# Patient Record
Sex: MALE | Race: WHITE | NOT HISPANIC OR LATINO | Employment: OTHER | URBAN - METROPOLITAN AREA
[De-identification: names, ages, dates, MRNs, and addresses within clinical notes are randomized per-mention and may not be internally consistent; named-entity substitution may affect disease eponyms.]

---

## 2023-08-29 PROBLEM — G47.33 OSA ON CPAP: Status: ACTIVE | Noted: 2023-08-29

## 2023-08-29 PROBLEM — M70.22 OLECRANON BURSITIS OF LEFT ELBOW: Status: ACTIVE | Noted: 2023-08-29

## 2023-08-29 PROBLEM — E23.6 EMPTY SELLA SYNDROME (MULTI): Status: ACTIVE | Noted: 2023-08-29

## 2023-08-29 PROBLEM — R14.2 ERUCTATION: Status: ACTIVE | Noted: 2023-08-29

## 2023-08-29 PROBLEM — K21.9 GERD (GASTROESOPHAGEAL REFLUX DISEASE): Status: ACTIVE | Noted: 2023-08-29

## 2023-08-29 PROBLEM — R06.09 EXERTIONAL DYSPNEA: Status: ACTIVE | Noted: 2023-08-29

## 2023-08-29 PROBLEM — M54.17 LUMBOSACRAL RADICULOPATHY AT L5: Status: ACTIVE | Noted: 2023-08-29

## 2023-08-29 PROBLEM — D68.52 PROTHROMBIN G20210A MUTATION (MULTI): Status: ACTIVE | Noted: 2023-08-29

## 2023-08-29 PROBLEM — E04.1 THYROID NODULE: Status: ACTIVE | Noted: 2023-08-29

## 2023-08-29 PROBLEM — E78.5 HYPERLIPIDEMIA: Status: ACTIVE | Noted: 2023-08-29

## 2023-08-29 PROBLEM — I25.10 ARTERIOSCLEROTIC HEART DISEASE: Status: ACTIVE | Noted: 2023-08-29

## 2023-08-29 PROBLEM — R97.20 PSA ELEVATION: Status: ACTIVE | Noted: 2023-08-29

## 2023-08-29 PROBLEM — R41.3 MEMORY CHANGE: Status: ACTIVE | Noted: 2023-08-29

## 2023-08-29 PROBLEM — I87.2 VENOUS INSUFFICIENCY (CHRONIC) (PERIPHERAL): Status: ACTIVE | Noted: 2023-08-29

## 2023-08-29 PROBLEM — J30.9 ALLERGIC RHINITIS: Status: ACTIVE | Noted: 2023-08-29

## 2023-08-29 PROBLEM — M85.80 OSTEOPENIA: Status: ACTIVE | Noted: 2023-08-29

## 2023-08-29 PROBLEM — I10 HYPERTENSION: Status: ACTIVE | Noted: 2023-08-29

## 2023-08-29 PROBLEM — I48.91 ATRIAL FIBRILLATION (MULTI): Status: ACTIVE | Noted: 2023-08-29

## 2023-08-29 RX ORDER — ALENDRONATE SODIUM 70 MG/1
70 TABLET ORAL
COMMUNITY

## 2023-08-29 RX ORDER — MODAFINIL 200 MG/1
1 TABLET ORAL 2 TIMES DAILY
COMMUNITY
Start: 2014-04-18

## 2023-08-29 RX ORDER — PERMETHRIN 50 MG/G
CREAM TOPICAL ONCE
COMMUNITY
End: 2023-10-20 | Stop reason: WASHOUT

## 2023-08-29 RX ORDER — FERROUS SULFATE, DRIED 160(50) MG
1 TABLET, EXTENDED RELEASE ORAL DAILY
COMMUNITY
End: 2023-10-20 | Stop reason: WASHOUT

## 2023-08-29 RX ORDER — SILDENAFIL 100 MG/1
TABLET, FILM COATED ORAL
COMMUNITY
Start: 2021-05-31

## 2023-08-29 RX ORDER — ESOMEPRAZOLE MAGNESIUM 40 MG/1
1 CAPSULE, DELAYED RELEASE ORAL DAILY
COMMUNITY
Start: 2014-04-01 | End: 2023-10-09 | Stop reason: SDUPTHER

## 2023-08-29 RX ORDER — DIGOXIN 125 MCG
1 TABLET ORAL DAILY
COMMUNITY
Start: 2018-08-03

## 2023-08-29 RX ORDER — ATORVASTATIN CALCIUM 20 MG/1
1 TABLET, FILM COATED ORAL DAILY
COMMUNITY
Start: 2015-04-27

## 2023-08-29 RX ORDER — NIACIN 500 MG/1
1 TABLET, EXTENDED RELEASE ORAL DAILY
COMMUNITY
Start: 2014-04-01

## 2023-08-29 RX ORDER — GLUCOSAMINE HCL 500 MG
1 TABLET ORAL DAILY
COMMUNITY
Start: 2014-04-01

## 2023-08-29 RX ORDER — AMLODIPINE BESYLATE 5 MG/1
1 TABLET ORAL DAILY
COMMUNITY
Start: 2017-03-18

## 2023-08-29 RX ORDER — ACETAMINOPHEN 500 MG
1 TABLET ORAL DAILY
COMMUNITY
Start: 2015-10-27 | End: 2023-10-20 | Stop reason: WASHOUT

## 2023-08-29 RX ORDER — IPRATROPIUM BROMIDE 42 UG/1
SPRAY, METERED NASAL
COMMUNITY
Start: 2017-11-07

## 2023-08-29 RX ORDER — AZELASTINE 1 MG/ML
1 SPRAY, METERED NASAL 2 TIMES DAILY
COMMUNITY

## 2023-08-29 RX ORDER — DUTASTERIDE AND TAMSULOSIN HYDROCHLORIDE CAPSULES .5; .4 MG/1; MG/1
CAPSULE ORAL
COMMUNITY
End: 2023-10-20 | Stop reason: WASHOUT

## 2023-08-29 RX ORDER — NEOMYCIN SULFATE, POLYMYXIN B SULFATE AND DEXAMETHASONE 3.5; 10000; 1 MG/ML; [USP'U]/ML; MG/ML
1 SUSPENSION/ DROPS OPHTHALMIC 4 TIMES DAILY
Status: ON HOLD | COMMUNITY
End: 2023-10-24 | Stop reason: ALTCHOICE

## 2023-08-29 RX ORDER — CELECOXIB 200 MG/1
CAPSULE ORAL
COMMUNITY
Start: 2021-06-15

## 2023-08-29 RX ORDER — ASPIRIN 81 MG
2 TABLET, DELAYED RELEASE (ENTERIC COATED) ORAL DAILY
COMMUNITY
Start: 2015-12-04

## 2023-08-29 RX ORDER — TAMSULOSIN HYDROCHLORIDE 0.4 MG/1
CAPSULE ORAL
COMMUNITY
Start: 2017-05-15 | End: 2023-10-24 | Stop reason: HOSPADM

## 2023-08-29 RX ORDER — LOSARTAN POTASSIUM 50 MG/1
1 TABLET ORAL DAILY
COMMUNITY
Start: 2021-08-16

## 2023-08-29 RX ORDER — MECLIZINE HYDROCHLORIDE 25 MG/1
25 TABLET ORAL 3 TIMES DAILY PRN
COMMUNITY
End: 2023-10-20 | Stop reason: WASHOUT

## 2023-08-29 RX ORDER — MOLNUPIRAVIR 200 MG/1
4 CAPSULE ORAL 2 TIMES DAILY
COMMUNITY
Start: 2022-08-24 | End: 2023-10-20 | Stop reason: WASHOUT

## 2023-10-09 ENCOUNTER — TELEPHONE (OUTPATIENT)
Dept: GASTROENTEROLOGY | Facility: HOSPITAL | Age: 78
End: 2023-10-09
Payer: MEDICARE

## 2023-10-09 DIAGNOSIS — K21.9 GASTROESOPHAGEAL REFLUX DISEASE, UNSPECIFIED WHETHER ESOPHAGITIS PRESENT: Primary | ICD-10-CM

## 2023-10-09 RX ORDER — ESOMEPRAZOLE MAGNESIUM 40 MG/1
CAPSULE, DELAYED RELEASE ORAL
Qty: 60 CAPSULE | Refills: 11 | Status: SHIPPED | OUTPATIENT
Start: 2023-10-09

## 2023-10-17 ENCOUNTER — LAB (OUTPATIENT)
Dept: LAB | Facility: LAB | Age: 78
End: 2023-10-17
Payer: MEDICARE

## 2023-10-17 ENCOUNTER — APPOINTMENT (OUTPATIENT)
Dept: PREADMISSION TESTING | Facility: HOSPITAL | Age: 78
End: 2023-10-17
Payer: MEDICARE

## 2023-10-17 ENCOUNTER — TELEPHONE (OUTPATIENT)
Dept: PREADMISSION TESTING | Facility: HOSPITAL | Age: 78
End: 2023-10-17

## 2023-10-17 DIAGNOSIS — N40.1 BENIGN PROSTATIC HYPERPLASIA WITH LOWER URINARY TRACT SYMPTOMS: Primary | ICD-10-CM

## 2023-10-17 DIAGNOSIS — Z01.818 ENCOUNTER FOR OTHER PREPROCEDURAL EXAMINATION: ICD-10-CM

## 2023-10-17 LAB
ALBUMIN SERPL BCP-MCNC: 4.2 G/DL (ref 3.4–5)
ALP SERPL-CCNC: 65 U/L (ref 33–136)
ALT SERPL W P-5'-P-CCNC: 13 U/L (ref 10–52)
ANION GAP SERPL CALC-SCNC: 13 MMOL/L (ref 10–20)
AST SERPL W P-5'-P-CCNC: 14 U/L (ref 9–39)
BILIRUB SERPL-MCNC: 0.5 MG/DL (ref 0–1.2)
BUN SERPL-MCNC: 17 MG/DL (ref 6–23)
CALCIUM SERPL-MCNC: 8.4 MG/DL (ref 8.6–10.3)
CHLORIDE SERPL-SCNC: 105 MMOL/L (ref 98–107)
CO2 SERPL-SCNC: 24 MMOL/L (ref 21–32)
CREAT SERPL-MCNC: 0.85 MG/DL (ref 0.5–1.3)
ERYTHROCYTE [DISTWIDTH] IN BLOOD BY AUTOMATED COUNT: 13.9 % (ref 11.5–14.5)
GFR SERPL CREATININE-BSD FRML MDRD: 90 ML/MIN/1.73M*2
GLUCOSE SERPL-MCNC: 102 MG/DL (ref 74–99)
HCT VFR BLD AUTO: 38.1 % (ref 41–52)
HGB BLD-MCNC: 12.4 G/DL (ref 13.5–17.5)
MCH RBC QN AUTO: 28.8 PG (ref 26–34)
MCHC RBC AUTO-ENTMCNC: 32.5 G/DL (ref 32–36)
MCV RBC AUTO: 88 FL (ref 80–100)
NRBC BLD-RTO: 0 /100 WBCS (ref 0–0)
PLATELET # BLD AUTO: 217 X10*3/UL (ref 150–450)
PMV BLD AUTO: 9.8 FL (ref 7.5–11.5)
POTASSIUM SERPL-SCNC: 4 MMOL/L (ref 3.5–5.3)
PROT SERPL-MCNC: 6.4 G/DL (ref 6.4–8.2)
RBC # BLD AUTO: 4.31 X10*6/UL (ref 4.5–5.9)
SODIUM SERPL-SCNC: 138 MMOL/L (ref 136–145)
WBC # BLD AUTO: 6.6 X10*3/UL (ref 4.4–11.3)

## 2023-10-17 PROCEDURE — 85027 COMPLETE CBC AUTOMATED: CPT

## 2023-10-17 PROCEDURE — 80053 COMPREHEN METABOLIC PANEL: CPT

## 2023-10-17 PROCEDURE — 36415 COLL VENOUS BLD VENIPUNCTURE: CPT

## 2023-10-17 PROCEDURE — 87086 URINE CULTURE/COLONY COUNT: CPT

## 2023-10-18 ENCOUNTER — TELEPHONE (OUTPATIENT)
Dept: GASTROENTEROLOGY | Facility: CLINIC | Age: 78
End: 2023-10-18

## 2023-10-18 ENCOUNTER — TELEMEDICINE (OUTPATIENT)
Dept: GASTROENTEROLOGY | Facility: CLINIC | Age: 78
End: 2023-10-18
Payer: MEDICARE

## 2023-10-18 DIAGNOSIS — K21.9 GASTROESOPHAGEAL REFLUX DISEASE, UNSPECIFIED WHETHER ESOPHAGITIS PRESENT: Primary | ICD-10-CM

## 2023-10-18 LAB — BACTERIA UR CULT: NORMAL

## 2023-10-18 PROCEDURE — 99214 OFFICE O/P EST MOD 30 MIN: CPT | Performed by: INTERNAL MEDICINE

## 2023-10-18 NOTE — TELEPHONE ENCOUNTER
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 In 2017, he had an egd and the findings were normal.

## 2023-10-18 NOTE — ASSESSMENT & PLAN NOTE
"? GERD sx. ? Secondary to mode of taking Nexium with no relation to meals.    No\"warning sign\".    Due for surveillance colonoscopy    Plan:  Instructed in correct Nexium before meals on an empty stomach.    EGD/colonoscopy  "

## 2023-10-18 NOTE — PROGRESS NOTES
"Subjective     History of Present Illness:   Kumar Malave is a 77 y.o. male who presents to GI clinic for having pain chest, left arm, sometimes burning \"throughout my body, head, feet.\"  Went up to twice a day on Nexium but takes AM and sometimes doesn't eat till lunch. Then takes the second one at bedtime.  Only occasionally takes the Celebrex  Has some kind of coffee (ice cream, cappuccino - cold) once a day.    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    Review of Systems  Review of Systems    Social History        Allergies  Not on File    Medications  Current Outpatient Medications   Medication Instructions    alendronate (FOSAMAX) 70 mg, oral, Every 7 days, Take in the morning with a full glass of water, on an empty stomach, and do not take anything else by mouth or lie down for the next 30 min.    amLODIPine (Norvasc) 5 mg tablet 1 tablet, oral, Daily    apixaban (Eliquis) 2.5 mg tablet 1 tablet, oral, 2 times daily    aspirin-calcium carbonate 81 mg-300 mg calcium(777 mg) tablet 1 tablet, oral, Daily    atorvastatin (Lipitor) 20 mg tablet 1 tablet, oral, Daily    azelastine (Astelin) 137 mcg (0.1 %) nasal spray 1 spray, Each Nostril, 2 times daily, Use in each nostril as directed    calcium carbonate-vitamin D3 500 mg-5 mcg (200 unit) tablet 1 tablet, oral, Daily    celecoxib (CeleBREX) 200 mg capsule oral    cholecalciferol (Vitamin D-3) 50 mcg (2,000 unit) capsule 1 capsule, oral, Daily    digoxin (Lanoxin) 125 MCG tablet 1 tablet, oral, Daily    docusate sodium (Colace) 100 mg tablet 2 tablets, oral, Daily    dutasteride-tamsulosin 0.5-0.4 mg capsule, ER multiphase 24 hr oral, bedtime    esomeprazole (NexIUM) 40 mg DR capsule 1 po 1-2 times daily, before breakfast and dinner, as directed    ipratropium (Atrovent) 42 mcg (0.06 %) nasal spray nasal, USE 2 SPRAYS IN EACH NOSTRIL 2 TO 3 TIMES DAILY.<BR>    losartan (Cozaar) 50 mg tablet 1 tablet, oral, Daily    magnesium, amino acid " "chelate, 133 mg tablet 1 tablet, oral, 2 times daily    meclizine (ANTIVERT) 25 mg, oral, 3 times daily PRN    modafinil (ProvigiL) 200 mg tablet 1 tablet, oral, 2 times daily    molnupiravir (Lagevrio, EUA,) 200 mg capsule 4 capsules, oral, 2 times daily    neomycin-polymyxin-dexAMETHasone (Maxitrol) 3.5mg/mL-10,000 unit/mL-0.1 % ophthalmic suspension 1 drop, 4 times daily    niacin (Niaspan Extended-Release) 500 mg ER tablet 1 tablet, oral, Daily    permethrin (Elimite) 5 % cream Topical, Once    sildenafil (Viagra) 100 mg tablet oral, TAKE 1 TABLET DAILY 1 HOUR BEFORE NEEDED<BR>    tamsulosin (Flomax) 0.4 mg 24 hr capsule oral, TAKE 2 CAPSULE Bedtime<BR>    ubidecarenone (coenzyme Q10) 100 mg tablet 1 tablet, oral, Daily    vit A/vit C/vit E/zinc/copper (VITAMINS A,C,E-ZINC-COPPER ORAL) 1 capsule, oral, 2 times daily        Objective   There were no vitals taken for this visit.   Physical Exam      Results from last 7 days   Lab Units 10/17/23  0956   WBC AUTO x10*3/uL 6.6   HEMOGLOBIN g/dL 12.4*   HEMATOCRIT % 38.1*   PLATELETS AUTO x10*3/uL 217     Results from last 7 days   Lab Units 10/17/23  0956   SODIUM mmol/L 138   POTASSIUM mmol/L 4.0   CHLORIDE mmol/L 105   CO2 mmol/L 24   BUN mg/dL 17   CREATININE mg/dL 0.85   CALCIUM mg/dL 8.4*   PROTEIN TOTAL g/dL 6.4   BILIRUBIN TOTAL mg/dL 0.5   ALK PHOS U/L 65   ALT U/L 13   AST U/L 14   GLUCOSE mg/dL 102*             Assessment/Plan   Kumar Malave is a 77 y.o. male who presents to GI clinic for.    GERD (gastroesophageal reflux disease)  ? GERD sx. ? Secondary to mode of taking Nexium with no relation to meals.    No\"warning sign\".    Due for surveillance colonoscopy    Plan:  Instructed in correct Nexium before meals on an empty stomach.    EGD/colonoscopy       Jeramy Zhao MD         "

## 2023-10-20 ENCOUNTER — PRE-ADMISSION TESTING (OUTPATIENT)
Dept: PREADMISSION TESTING | Facility: HOSPITAL | Age: 78
End: 2023-10-20
Payer: MEDICARE

## 2023-10-20 ENCOUNTER — ANESTHESIA EVENT (OUTPATIENT)
Dept: OPERATING ROOM | Facility: HOSPITAL | Age: 78
End: 2023-10-20
Payer: MEDICARE

## 2023-10-20 VITALS
HEART RATE: 62 BPM | OXYGEN SATURATION: 96 % | BODY MASS INDEX: 25.53 KG/M2 | DIASTOLIC BLOOD PRESSURE: 69 MMHG | SYSTOLIC BLOOD PRESSURE: 130 MMHG | WEIGHT: 188.49 LBS | HEIGHT: 72 IN | RESPIRATION RATE: 18 BRPM | TEMPERATURE: 97 F

## 2023-10-20 DIAGNOSIS — I10 HTN (HYPERTENSION), BENIGN: Primary | ICD-10-CM

## 2023-10-20 PROCEDURE — 99204 OFFICE O/P NEW MOD 45 MIN: CPT | Performed by: PHYSICIAN ASSISTANT

## 2023-10-20 PROCEDURE — 93005 ELECTROCARDIOGRAM TRACING: CPT | Performed by: PHYSICIAN ASSISTANT

## 2023-10-20 RX ORDER — TADALAFIL 5 MG/1
5 TABLET ORAL DAILY
COMMUNITY
End: 2024-03-13 | Stop reason: SDUPTHER

## 2023-10-20 ASSESSMENT — ENCOUNTER SYMPTOMS
SINUS CONGESTION: 0
SKIN CHANGES: 0
SHORTNESS OF BREATH: 0
FEVER: 0
WOUND: 0
HEMOPTYSIS: 0
DIFFICULTY URINATING: 0
DYSPNEA AT REST: 0
BRUISES/BLEEDS EASILY: 1
NECK STIFFNESS: 0
EYE DISCHARGE: 0
VOMITING: 0
ARTHRALGIAS: 0
VISUAL CHANGE: 0
MYALGIAS: 0
DYSURIA: 0
CONSTIPATION: 1
EYE PAIN: 0
NUMBNESS: 0
CHILLS: 0
TROUBLE SWALLOWING: 0
PALPITATIONS: 0
UNEXPECTED WEIGHT CHANGE: 0
DOUBLE VISION: 0
ABDOMINAL DISTENTION: 0
RHINORRHEA: 0
WHEEZING: 0
ABDOMINAL PAIN: 0
CONFUSION: 0
WEAKNESS: 0
COUGH: 0
NAUSEA: 0
LIGHT-HEADEDNESS: 0
LIMITED RANGE OF MOTION: 0
NECK PAIN: 0
EXCESSIVE BLEEDING: 0
DYSPNEA WITH EXERTION: 0
DIARRHEA: 0
BLOOD IN STOOL: 0

## 2023-10-20 NOTE — PREPROCEDURE INSTRUCTIONS
Medication List            Accurate as of October 20, 2023  8:39 AM. Always use your most recent med list.                amLODIPine 5 mg tablet  Commonly known as: Norvasc  Medication Adjustments for Surgery: Take morning of surgery with sip of water, no other fluids     aspirin-calcium carbonate 81 mg-300 mg calcium(777 mg) tablet  Medication Adjustments for Surgery: Take morning of surgery with sip of water, no other fluids     atorvastatin 20 mg tablet  Commonly known as: Lipitor  Medication Adjustments for Surgery: Take morning of surgery with sip of water, no other fluids     azelastine 137 mcg (0.1 %) nasal spray  Commonly known as: Astelin  Medication Adjustments for Surgery: Take morning of surgery with sip of water, no other fluids     celecoxib 200 mg capsule  Commonly known as: CeleBREX  Medication Adjustments for Surgery: Stop 7 days before surgery     coenzyme Q10 100 mg tablet     digoxin 125 MCG tablet  Commonly known as: Lanoxin  Medication Adjustments for Surgery: Stop 7 days before surgery     docusate sodium 100 mg tablet  Commonly known as: Colace  Medication Adjustments for Surgery: Continue until night before surgery     Eliquis 2.5 mg tablet  Generic drug: apixaban  Medication Adjustments for Surgery: Stop 3 days before surgery  Notes to patient: Last dose 10/20/23 (Friday)     esomeprazole 40 mg DR capsule  Commonly known as: NexIUM  1 po 1-2 times daily, before breakfast and dinner, as directed  Medication Adjustments for Surgery: Take morning of surgery with sip of water, no other fluids     Fosamax 70 mg tablet  Generic drug: alendronate  Medication Adjustments for Surgery: Continue until night before surgery     ipratropium 42 mcg (0.06 %) nasal spray  Commonly known as: Atrovent  Medication Adjustments for Surgery: Stop 1 day before surgery     losartan 50 mg tablet  Commonly known as: Cozaar  Medication Adjustments for Surgery: Stop 1 day before surgery     magnesium (amino acid  chelate) 133 mg tablet  Medication Adjustments for Surgery: Continue until night before surgery     neomycin-polymyxin-dexAMETHasone 3.5mg/mL-10,000 unit/mL-0.1 % ophthalmic suspension  Commonly known as: Maxitrol  Medication Adjustments for Surgery: Take morning of surgery with sip of water, no other fluids     Niaspan Extended-Release 500 mg ER tablet  Generic drug: niacin  Medication Adjustments for Surgery: Continue until night before surgery     ProvigiL 200 mg tablet  Generic drug: modafinil  Medication Adjustments for Surgery: Continue until night before surgery     sildenafil 100 mg tablet  Commonly known as: Viagra  Medication Adjustments for Surgery: Stop 3 days before surgery     tadalafil 5 mg tablet  Commonly known as: Cialis  Medication Adjustments for Surgery: Stop 3 days before surgery     tamsulosin 0.4 mg 24 hr capsule  Commonly known as: Flomax  Medication Adjustments for Surgery: Take morning of surgery with sip of water, no other fluids                            PAT DISCHARGE INSTRUCTIONS    Pre-surgery COVID-19 testing: We want to perform your surgery in the safest possible way to give you the best chance of a smooth recovery. One of our healthcare members will reach out to you 5-7 days prior to your procedure/surgery to schedule you for COVID testing. This testing must be done 2-3 days before your procedure/surgery even if you do not show symptoms consistent with the virus.   Self-Quarantine -Avoid contact with others or leaving the home except for a doctor’s appointment AFTER your COVID test.   Check for symptoms daily prior to surgery and notify us if you have any of the following    Fever = 38.0 C (100.4 F)     New respiratory symptoms (e.g. cough, shortness of breath, respiratory distress, sore throat)   Recent loss of taste or smell   Flu like symptoms such as headache, fatigue or gastrointestinal symptoms      Please let your surgeon know if:      You develop any open sores, shingles,  burning or painful urination as these may increase your risk of an infection.   You no longer wish to have the surgery.   Any other personal circumstances change that may lead to the need to cancel or defer this surgery-such as being sick or getting admitted to any hospital within one week of your planned procedure.    Your contact details change, such as a change of address or phone number.    Starting now:     Stop smoking. It is well known that quitting smoking can make a huge difference to your health and recovery from surgery. The longer you abstain from smoking, the better your chances of a healthy recovery. If you need help with quitting, call 1-800-QUIT-NOW to be connected to a trained counselor who will discuss the best methods to help you quit.       One week before your surgery:     Please stop all supplements 7 days prior to surgery. Vitamins A, C and E must be stopped for 7 days but Vitamins B and D may be continued till the day before surgery.    Please stop taking NSAID pain medicine such as Advil and Motrin 7 days before surgery.    If you develop any fever, cough, cold, rashes, cuts, scratches, scrapes, urinary symptoms or infection anywhere on your body (including teeth and gums) prior to surgery, please call your surgeon’s office as soon as possible. This may require treatment to reduce the chance of cancellation on the day of surgery.    The day before your surgery:     DIET- Do not eat any solid food after midnight the night before surgery. Clear liquids (water, tea, black coffee and apple juice) are acceptable up to 2 hours before your surgery.    Get a good night’s rest. Use the special soap for bathing if you have been instructed to use one.    Arrival time is typically 2 hours prior to the time of surgery. The Pre-operative nursing team will call between the hours of 2 p.m. and 6 p.m. the business day before your surgery to provide you with your arrival time. If you have not received a phone  call by 6 p.m., please call [phone] for assistance.    Scheduled surgery times may change and you will be notified if this occurs - please check your personal voicemail for any updates.    In the event of an illness or the need to cancel your surgery - Please notify your surgeon and/or [facility] operative services [phone].    On the morning of surgery:   Wear comfortable, loose fitting clothes which open in the front. Please do not wear moisturizers, creams, lotions or perfume.    Please bring with you to surgery:   Photo ID and insurance card   Current list of medicines and allergies   Pacemaker/ Defibrillator/Heart stent cards   CPAP machine and mask    Slings/ splints/ crutches   A copy of your complete advanced directive/DHPOA.    Please do NOT bring with you to surgery:   All jewelry and valuables should be left at home.   Prosthetic devices such as contact lenses, hearing aids, dentures, eyelash extensions, hairpins and body piercings must be removed prior to going in to the surgical suite.    Parking and where to go when you arrive:      Free  parking is available in the front of the building for all patients scheduled for surgery. Please check in at the desk just behind the main entrance and let them know you are here for surgery and you will be directed to the 2nd floor operating suite.    After outpatient surgery:   A responsible adult MUST accompany you at the time of discharge and stay with you for 24 hours after your surgery. You may NOT drive yourself home after surgery.    Do not drive, operate machinery, make critical decisions or do activities that require co-ordination or balance until after a night’s sleep.   Do not drink alcoholic beverages for 24 hours.   Instructions for resuming your medications will be provided by your surgeon.      CALL YOUR DOCTOR AFTER SURGERY IF YOU HAVE:     Chills and/or a fever of 101° F or higher.    Redness, swelling, pus or drainage from  your surgical wound or a bad smell from the wound.    Lightheadedness, fainting or confusion.    Persistent vomiting (throwing up) and are not able to eat or drink for 12 hours.    Three or more loose, watery bowel movements in 24 hours (diarrhea).   Difficulty or pain while urinating( after non-urological surgery)    Pain and swelling in your legs, especially if it is only on one side.    Difficulty breathing or are breathing faster than normal.    Any new concerning symptoms.

## 2023-10-20 NOTE — CPM/PAT H&P
[facility] Evaluation       Name: Kumar Malave (Kumar Malave)  /Age: 1945/77 y.o.         Date of Consult: 10/20/23    Referring Provider: Dr. Crook    Surgery, Date, and Length: Prostate Resection Transurethral HoLep, 10/24/23, 2HRS    Kumar Malave is a 77 year-old male who presents to the [facility] for perioperative risk assessment prior to surgery.    Patient presents with a primary diagnosis of BPH. He takes tamsulosin which helps with his urinary symptoms.  MRI completed 2023 shows prostate is measuring 220g. He had one episode of urinary retention a few years ago, but no catheter currently.  He is experiencing urgency with occasional leakage at time.  No nocturia. No dysuria, f/c/n/v or gross hematuria.    This note was created in part upon personal review of patient's medical records.      Patient is scheduled to have Prostate Resection Transurethral HoLep      Pt denies any past history of anesthetic complications such as PONV, awareness, prolonged sedation, dental damage, aspiration, cardiac arrest, difficult intubation, difficult I.V. access or unexpected hospital admissions.  NO malignant hyperthermia and or pseudocholinesterase deficiency.  No history of blood transfusions     The patient is not a [employer] and will accept blood and blood products if medically indicated.   Type and screen sent.     Past Medical History:   Diagnosis Date    Cellulitis of left lower limb 2017    Cellulitis of left leg without foot    Clotting disorder (CMS/HCC)     Prothrombin gene mutation    Contusion of left hip, initial encounter 2017    Hematoma of hip, left, initial encounter    Coronary artery disease     Elevated prostate specific antigen (PSA) 2017    PSA elevation    GERD (gastroesophageal reflux disease) 2023    Hypertension     Nontoxic single thyroid nodule 2017    Thyroid nodule    Olecranon bursitis, left elbow 2016    Olecranon bursitis of left elbow    Other  amnesia 04/01/2014    Memory change    Other disorders of pituitary gland (CMS/[facility]) 07/11/2017    Empty sella syndrome    Other fatigue 11/07/2017    Fatigue    Other forms of dyspnea 10/27/2015    Exertional dyspnea    Other specified disorders of bone density and structure, unspecified site 04/01/2014    Osteopenia    Pain in leg, unspecified 11/06/2014    Leg pain    Paresthesia of skin 03/14/2017    Tingling sensation    Personal history of malignant neoplasm of other organs and systems     History of squamous cell carcinoma    Personal history of pulmonary embolism 08/03/2018    History of pulmonary embolism    Prothrombin gene mutation (CMS/[facility]) 03/14/2017    Prothrombin [id number] mutation    Pulmonary embolism (CMS/[facility])     Radiculopathy, lumbosacral region 03/14/2017    Lumbosacral radiculopathy at L5    Syncope and collapse 08/16/2016    Syncope, unspecified syncope type    Unspecified atrial flutter (CMS/[facility]) 03/14/2017    Atrial flutter with rapid ventricular response    Unspecified disorder of vestibular function, unspecified ear 04/01/2014    Vestibular dizziness    Venous insufficiency (chronic) (peripheral) 06/21/2017    Venous insufficiency (chronic) (peripheral)       Past Surgical History:   Procedure Laterality Date    MR HEAD ANGIO WO IV CONTRAST  4/9/2014    MR HEAD ANGIO WO IV CONTRAST 4/9/2014 [facility] CLINICAL LEGACY    MR NECK ANGIO WO IV CONTRAST  4/9/2014    MR NECK ANGIO WO IV CONTRAST 4/9/2014 [facility] CLINICAL LEGACY    OTHER SURGICAL HISTORY  04/01/2014    Previous Stent Placement       Patient  has no history on file for sexual activity.    Family History   Problem Relation Name Age of Onset    Coronary artery disease Father      Other (family health problem) Other       Social History     Socioeconomic History    Marital status:      Spouse name: Not on file    Number of children: Not on file    Years of education: Not on file    Highest education level: Not on file   Occupational  History    Not on file   Tobacco Use    Smoking status: Not on file    Smokeless tobacco: Not on file   Substance and Sexual Activity    Alcohol use: Not on file    Drug use: Not on file    Sexual activity: Not on file   Other Topics Concern    Not on file   Social History Narrative    Not on file     Social Determinants of Health     Financial Resource Strain: Not on file   Food Insecurity: Not on file   Transportation Needs: Not on file   Physical Activity: Not on file   Stress: Not on file   Social Connections: Not on file   Intimate Partner Violence: Not on file   Housing Stability: Not on file        No Known Allergies    Prior to Admission medications    Medication Sig Start Date End Date Taking? Authorizing Provider   alendronate (Fosamax) 70 mg tablet Take 1 tablet (70 mg) by mouth every 7 days. Take in the morning with a full glass of water, on an empty stomach, and do not take anything else by mouth or lie down for the next 30 min.    Historical Provider, MD   amLODIPine (Norvasc) 5 mg tablet Take 1 tablet (5 mg) by mouth once daily. 3/18/17   Historical Provider, MD   apixaban (Eliquis) 2.5 mg tablet Take 1 tablet (2.5 mg) by mouth 2 times a day. 10/3/20   Historical Provider, MD   aspirin-calcium carbonate 81 mg-300 mg calcium(777 mg) tablet Take 1 tablet by mouth once daily.    Historical Provider, MD   atorvastatin (Lipitor) 20 mg tablet Take 1 tablet (20 mg) by mouth once daily. 4/27/15   Historical Provider, MD   azelastine (Astelin) 137 mcg (0.1 %) nasal spray Administer 1 spray into each nostril 2 times a day. Use in each nostril as directed    Historical Provider, MD   calcium carbonate-vitamin D3 500 mg-5 mcg (200 unit) tablet Take 1 tablet by mouth once daily.    Historical Provider, MD   celecoxib (CeleBREX) 200 mg capsule Take by mouth. 6/15/21   Historical Provider, MD   cholecalciferol (Vitamin D-3) 50 mcg (2,000 unit) capsule Take 1 capsule (50 mcg) by mouth once daily. 10/27/15    Historical Provider, MD   digoxin (Lanoxin) 125 MCG tablet Take 1 tablet (125 mcg) by mouth once daily. 8/3/18   Historical Provider, MD   docusate sodium (Colace) 100 mg tablet Take 2 tablets (200 mg) by mouth once daily. 12/4/15   Historical Provider, MD   dutasteride-tamsulosin 0.5-0.4 mg capsule, ER multiphase 24 hr Take by mouth. bedtime    Historical Provider, MD   esomeprazole (NexIUM) 40 mg DR capsule 1 po 1-2 times daily, before breakfast and dinner, as directed 10/9/23   Jeramy Zhao MD   ipratropium (Atrovent) 42 mcg (0.06 %) nasal spray Administer into affected nostril(s). USE 2 SPRAYS IN EACH NOSTRIL 2 TO 3 TIMES DAILY. 11/7/17   Historical Provider, MD   losartan (Cozaar) 50 mg tablet Take 1 tablet (50 mg) by mouth once daily. 8/16/21   Historical Provider, MD   magnesium, amino acid chelate, 133 mg tablet Take 1 tablet (133 mg) by mouth 2 times a day.    Historical Provider, MD   meclizine (Antivert) 25 mg tablet Take 1 tablet (25 mg) by mouth 3 times a day as needed for dizziness.    Historical Provider, MD   modafinil (ProvigiL) 200 mg tablet Take 1 tablet (200 mg) by mouth 2 times a day. 4/18/14   Historical Provider, MD   molnupiravir (Lagevrio, EUA,) 200 mg capsule Take 4 capsules (800 mg) by mouth twice a day. 8/24/22   Historical Provider, MD   neomycin-polymyxin-dexAMETHasone (Maxitrol) 3.5mg/mL-10,000 unit/mL-0.1 % ophthalmic suspension 1 drop 4 times a day.    Historical Provider, MD   niacin (Niaspan Extended-Release) 500 mg ER tablet Take 1 tablet (500 mg) by mouth once daily. 4/1/14   Historical Provider, MD   permethrin (Elimite) 5 % cream Apply topically 1 time.    Historical Provider, MD   sildenafil (Viagra) 100 mg tablet Take by mouth. TAKE 1 TABLET DAILY 1 HOUR BEFORE NEEDED 5/31/21   Historical Provider, MD   tamsulosin (Flomax) 0.4 mg 24 hr capsule Take by mouth. TAKE 2 CAPSULE Bedtime 5/15/17   Historical Provider, MD   ubidecarenone (coenzyme Q10) 100 mg tablet Take 1 tablet  by mouth once daily. 4/1/14   Historical Provider, MD   vit A/vit C/vit E/zinc/copper (VITAMINS A,C,E-ZINC-COPPER ORAL) Take 1 capsule by mouth 2 times a day.    Historical Provider, MD AGUAYO ROS:   Constitutional:    no fever   no chills   no unexpected weight change  Neuro/Psych:    no numbness   no weakness   no light-headedness   no confusion  Eyes:    no discharge   no pain   no vision loss   no diplopia   no visual disturbance   use of corrective lenses  Ears:    no ear pain   no hearing loss   no tinnitus  Nose:    no nasal discharge   no sinus congestion   no epistaxis  Mouth:    no dental issues   no mouth pain   no oral bleeding   no mouth lesions  Throat:    no throat pain   no dysphagia  Neck:    no neck pain   no neck stiffness  Cardio:    no chest pain   no palpitations   no peripheral edema   no dyspnea   no ARREOLA  Respiratory:    no cough   no wheezing   no hemoptysis   no shortness of breath  Endocrine:    no cold intolerance   no heat intolerance  GI:    no abdominal distention   no abdominal pain   constipation   no diarrhea   no nausea   no vomiting   no blood in stool  :    no difficulty urinating   no dysuria   no oliguria  Musculoskeletal:    no arthralgias   no myalgias   no decreased ROM  Hematologic:    bruises/bleeds easily (eliquis)   no excessive bleeding   no history of blood transfusion   blood clots  Skin:   no skin changes   no sores/wound   no rash      Physical Exam  Constitutional:       General: He is not in acute distress.     Appearance: Normal appearance.   HENT:      Head: Normocephalic and atraumatic.      Nose: Nose normal. No congestion.      Mouth/Throat:      Mouth: Mucous membranes are moist.      Pharynx: No posterior oropharyngeal erythema.   Eyes:      Extraocular Movements: Extraocular movements intact.      Conjunctiva/sclera: Conjunctivae normal.   Cardiovascular:      Rate and Rhythm: Normal rate and regular rhythm.      Pulses: Normal pulses.      Heart  "sounds: Normal heart sounds. No murmur heard.  Pulmonary:      Effort: Pulmonary effort is normal. No respiratory distress.      Breath sounds: Normal breath sounds. No stridor. No wheezing.   Abdominal:      General: Bowel sounds are normal.      Palpations: Abdomen is soft. There is no mass.      Tenderness: There is no abdominal tenderness. There is no rebound.   Musculoskeletal:         General: Normal range of motion.      Cervical back: Normal range of motion and neck supple.   Skin:     General: Skin is warm and dry.   Neurological:      General: No focal deficit present.      Mental Status: He is alert and oriented to person, place, and time.   Psychiatric:         Mood and Affect: Mood normal.         Behavior: Behavior normal.          PAT AIRWAY:   Airway:     Mallampati::  II    Neck ROM::  Full   No broken teeth, no dentures and no missing teeth          Visit Vitals  /69   Pulse 62   Temp 36.1 °C (97 °F)   Resp 18   Ht 1.817 m (5' 11.54\")   Wt 85.5 kg (188 lb 7.9 oz)   SpO2 96%   BMI 25.90 kg/m²   Smoking Status Never Assessed   BSA 2.08 m²        DASI Risk Score    No data to display       Caprini DVT Assessment      Flowsheet Row Most Recent Value   DVT Score 12   Labs/Test Results Positive Prothrombin Gene Mutation (58114T)   Current Status Major surgery planned, including arthroscopic and laproscopic (1-2 hours)   History SVT, DVT/PE   Age Over 75 years   BMI 30 or less          Modified Frailty Index    No data to display       CHADS2 Stroke Risk  Current as of a minute ago        4% 3 - 100%: High Risk   2 - 3%: Medium Risk   0 - 2%: Low Risk     No Change          This score determines the patient's risk of having a stroke if the patient has atrial fibrillation.          Points Metrics   0 Has Congestive Heart Failure:  No     Patients with congestive heart failure get 1 point.    Current as of a minute ago   1 Has Hypertension:  Yes     Patients with hypertension get 1 point.    Current " as of a minute ago   1 Age:  77     Patients who are 75 years of age or older get 1 point.    Current as of a minute ago   0 Has Diabetes:  No     Patients with diabetes get 1 point.    Current as of a minute ago   0 Had Stroke:  No  Had TIA:  No  Had Thromboembolism:  No     Patients who have had a stroke, TIA, or thromboembolism get 2 points.    Current as of a minute ago             Revised Cardiac Risk Index    No data to display       Apfel Simplified Score    No data to display       Risk Analysis Index Results This Encounter    No data found in the last 1 encounters.       Lab Results   Component Value Date    WBC 6.6 10/17/2023    HGB 12.4 (L) 10/17/2023    HCT 38.1 (L) 10/17/2023    MCV 88 10/17/2023     10/17/2023      Lab Results   Component Value Date    CREATININE 0.85 10/17/2023    BUN 17 10/17/2023     10/17/2023    K 4.0 10/17/2023     10/17/2023    CO2 24 10/17/2023         EKG 10/20/23  Sinus bradycardia with 1st degree AV block  Low voltage QRS  Cannot R/o anterior infarct, age undetermined  Abnormal EKG  Vent rate = 59 bpm    ECHO 1/2017  CONCLUSIONS:   1. The left ventricular systolic function is normal with a 60-65% estimated ejection fraction.   2. Spectral Doppler shows a pseudonormal pattern of left ventricular diastolic filling.   3. There is no evidence of left ventricular hypertrophy.   4. The left atrium is normal in size.    Stress test 12/2016  Summary:   1. No clinical or electrocardiographic evidence for ischemia at a maximal infusion.   2. No ECG changes from baseline.   3. Nuclear image results are reported separately.   4. The adequate level of stress was achieved.    Assessment and Plan:   Patient is a 77-year-old male scheduled for a Prostate Resection Transurethral HoLep with Dr. Crook on 10/24/23.  Patient has no active cardiac symptoms.   Patient denies any chest pain, tightness, heaviness, pressure, radiating pain, palpitations, irregular heartbeats,  "lightheadedness, cough, congestion, shortness of breath, ARREOLA, PND, near syncope, weight loss or gain.     RCRI  2 (type of surgery, prior MI)  , 10.1 % Risk of MACE    Cardiac:  CAD - s/p MI - bare metal stents of prox LAD at [facility] in setting of MI and MARGA in mid LAD with known distal left Cx occlusion. Most recent stress test is 12/2016.    HTN - cont amlodipine on dos; hold losartan 24 hours before surgery   Encouraged lifestyle modifications, low-sodium diet, and increase activity as tolerated.  Monitor BP and follow up with managing physician for readings sustaining >140/90.     Syncope - holter monitor was inconclusive; after ablation for A fib no episodes    Pulmonary:  PE - pt on Eliquis - per Dr. Jg Welch's note 8/28/23:    \"Low and acceptable risk for HOLEP with Dr. Crook.  Reasonable to stop Eliquis 3 days prior to procedure and restart 24-48 hrs post laser.  No NOT stop ASA 81mg given bare metal stents and MARGA.\"    Hematology  Anemia   10/17/23 H/H 12.4/38.1%    Patient instructed to ambulate as soon as possible postoperatively to decrease thromboembolic risk.   Initiate mechanical DVT prophylaxis as soon as possible and initiate chemical prophylaxis when deemed safe from a bleeding standpoint post surgery.     LABS: CBC, CMP reviewed from 10/17/23. Ucx pending from 10/17. T&S and EKG ordered today.    Followup: Ucx pending    STOP BANG: male, >50 = 2    Caprini: 11    Risk assessment complete.  Patient is scheduled for a intermediate surgical risk procedure.        Preoperative medication instructions were provided and reviewed with the patient.  Any additional testing or evaluation was explained to the patient.  Nothing by mouth instructions were discussed and patient's questions were answered prior to conclusion to this encounter.  Patient verbalized understanding of preoperative instructions given in preadmission testing; discharge instructions available in EMR.    This note was dictated by a speech " recognition.  Minor errors may have been detected in a speech recognition.

## 2023-10-21 LAB
ATRIAL RATE: 59 BPM
P AXIS: 64 DEGREES
P OFFSET: 157 MS
P ONSET: 100 MS
PR INTERVAL: 244 MS
Q ONSET: 222 MS
QRS COUNT: 10 BEATS
QRS DURATION: 90 MS
QT INTERVAL: 402 MS
QTC CALCULATION(BAZETT): 397 MS
QTC FREDERICIA: 399 MS
R AXIS: 14 DEGREES
T AXIS: 41 DEGREES
T OFFSET: 423 MS
VENTRICULAR RATE: 59 BPM

## 2023-10-21 PROCEDURE — 93010 ELECTROCARDIOGRAM REPORT: CPT | Performed by: INTERNAL MEDICINE

## 2023-10-23 RX ORDER — CEFAZOLIN SODIUM 2 G/100ML
2 INJECTION, SOLUTION INTRAVENOUS ONCE
Status: CANCELLED | OUTPATIENT
Start: 2023-10-23 | End: 2023-10-23

## 2023-10-24 ENCOUNTER — HOSPITAL ENCOUNTER (OUTPATIENT)
Facility: HOSPITAL | Age: 78
Setting detail: OUTPATIENT SURGERY
Discharge: HOME | End: 2023-10-24
Attending: UROLOGY | Admitting: UROLOGY
Payer: MEDICARE

## 2023-10-24 ENCOUNTER — ANESTHESIA (OUTPATIENT)
Dept: OPERATING ROOM | Facility: HOSPITAL | Age: 78
End: 2023-10-24
Payer: MEDICARE

## 2023-10-24 VITALS
OXYGEN SATURATION: 95 % | WEIGHT: 191.14 LBS | BODY MASS INDEX: 26.76 KG/M2 | HEART RATE: 78 BPM | SYSTOLIC BLOOD PRESSURE: 133 MMHG | DIASTOLIC BLOOD PRESSURE: 72 MMHG | RESPIRATION RATE: 2 BRPM | TEMPERATURE: 96.8 F | HEIGHT: 71 IN

## 2023-10-24 DIAGNOSIS — N40.1 BENIGN PROSTATIC HYPERPLASIA WITH LOWER URINARY TRACT SYMPTOMS: ICD-10-CM

## 2023-10-24 DIAGNOSIS — N13.8 BPH WITH OBSTRUCTION/LOWER URINARY TRACT SYMPTOMS: Primary | ICD-10-CM

## 2023-10-24 DIAGNOSIS — N40.1 BPH WITH OBSTRUCTION/LOWER URINARY TRACT SYMPTOMS: Primary | ICD-10-CM

## 2023-10-24 PROBLEM — I21.9 MI (MYOCARDIAL INFARCTION) (MULTI): Status: ACTIVE | Noted: 2023-10-24

## 2023-10-24 PROBLEM — D64.9 ANEMIA: Status: ACTIVE | Noted: 2023-10-24

## 2023-10-24 PROBLEM — I26.99 PULMONARY EMBOLUS (MULTI): Status: ACTIVE | Noted: 2023-10-24

## 2023-10-24 PROCEDURE — 2500000005 HC RX 250 GENERAL PHARMACY W/O HCPCS

## 2023-10-24 PROCEDURE — 2500000004 HC RX 250 GENERAL PHARMACY W/ HCPCS (ALT 636 FOR OP/ED)

## 2023-10-24 PROCEDURE — A52648 PR LASER VAPORIZATION SURGERY PROSTATE, COMPLETE

## 2023-10-24 PROCEDURE — 3600000008 HC OR TIME - EACH INCREMENTAL 1 MINUTE - PROCEDURE LEVEL THREE: Performed by: UROLOGY

## 2023-10-24 PROCEDURE — 52649 PROSTATE LASER ENUCLEATION: CPT | Performed by: UROLOGY

## 2023-10-24 PROCEDURE — A4217 STERILE WATER/SALINE, 500 ML: HCPCS | Mod: AU | Performed by: UROLOGY

## 2023-10-24 PROCEDURE — 88307 TISSUE EXAM BY PATHOLOGIST: CPT | Mod: TC,SUR | Performed by: UROLOGY

## 2023-10-24 PROCEDURE — 3600000003 HC OR TIME - INITIAL BASE CHARGE - PROCEDURE LEVEL THREE: Performed by: UROLOGY

## 2023-10-24 PROCEDURE — 7100000009 HC PHASE TWO TIME - INITIAL BASE CHARGE: Performed by: UROLOGY

## 2023-10-24 PROCEDURE — 7100000001 HC RECOVERY ROOM TIME - INITIAL BASE CHARGE: Performed by: UROLOGY

## 2023-10-24 PROCEDURE — 7100000010 HC PHASE TWO TIME - EACH INCREMENTAL 1 MINUTE: Performed by: UROLOGY

## 2023-10-24 PROCEDURE — 3700000002 HC GENERAL ANESTHESIA TIME - EACH INCREMENTAL 1 MINUTE: Performed by: UROLOGY

## 2023-10-24 PROCEDURE — 88307 TISSUE EXAM BY PATHOLOGIST: CPT | Performed by: PATHOLOGY

## 2023-10-24 PROCEDURE — C1758 CATHETER, URETERAL: HCPCS | Performed by: UROLOGY

## 2023-10-24 PROCEDURE — 2720000007 HC OR 272 NO HCPCS: Performed by: UROLOGY

## 2023-10-24 PROCEDURE — 3700000001 HC GENERAL ANESTHESIA TIME - INITIAL BASE CHARGE: Performed by: UROLOGY

## 2023-10-24 PROCEDURE — 88307 TISSUE EXAM BY PATHOLOGIST: CPT | Mod: TC | Performed by: UROLOGY

## 2023-10-24 PROCEDURE — 99100 ANES PT EXTEME AGE<1 YR&>70: CPT | Performed by: ANESTHESIOLOGY

## 2023-10-24 PROCEDURE — A52648 PR LASER VAPORIZATION SURGERY PROSTATE, COMPLETE: Performed by: ANESTHESIOLOGY

## 2023-10-24 PROCEDURE — 2500000004 HC RX 250 GENERAL PHARMACY W/ HCPCS (ALT 636 FOR OP/ED): Mod: AU | Performed by: UROLOGY

## 2023-10-24 PROCEDURE — 7100000002 HC RECOVERY ROOM TIME - EACH INCREMENTAL 1 MINUTE: Performed by: UROLOGY

## 2023-10-24 RX ORDER — FUROSEMIDE 10 MG/ML
INJECTION INTRAMUSCULAR; INTRAVENOUS AS NEEDED
Status: DISCONTINUED | OUTPATIENT
Start: 2023-10-24 | End: 2023-10-24

## 2023-10-24 RX ORDER — SODIUM CHLORIDE 0.9 G/100ML
IRRIGANT IRRIGATION AS NEEDED
Status: DISCONTINUED | OUTPATIENT
Start: 2023-10-24 | End: 2023-10-24 | Stop reason: HOSPADM

## 2023-10-24 RX ORDER — LIDOCAINE HYDROCHLORIDE 20 MG/ML
INJECTION, SOLUTION EPIDURAL; INFILTRATION; INTRACAUDAL; PERINEURAL AS NEEDED
Status: DISCONTINUED | OUTPATIENT
Start: 2023-10-24 | End: 2023-10-24

## 2023-10-24 RX ORDER — PHENAZOPYRIDINE HYDROCHLORIDE 200 MG/1
200 TABLET, FILM COATED ORAL
Qty: 9 TABLET | Refills: 0 | OUTPATIENT
Start: 2023-10-24 | End: 2023-10-30

## 2023-10-24 RX ORDER — NORETHINDRONE AND ETHINYL ESTRADIOL 0.5-0.035
KIT ORAL AS NEEDED
Status: DISCONTINUED | OUTPATIENT
Start: 2023-10-24 | End: 2023-10-24

## 2023-10-24 RX ORDER — CEFAZOLIN 1 G/1
INJECTION, POWDER, FOR SOLUTION INTRAVENOUS AS NEEDED
Status: DISCONTINUED | OUTPATIENT
Start: 2023-10-24 | End: 2023-10-24

## 2023-10-24 RX ORDER — SODIUM CHLORIDE, SODIUM LACTATE, POTASSIUM CHLORIDE, CALCIUM CHLORIDE 600; 310; 30; 20 MG/100ML; MG/100ML; MG/100ML; MG/100ML
100 INJECTION, SOLUTION INTRAVENOUS CONTINUOUS
Status: DISCONTINUED | OUTPATIENT
Start: 2023-10-24 | End: 2023-10-24 | Stop reason: HOSPADM

## 2023-10-24 RX ORDER — DEXAMETHASONE SODIUM PHOSPHATE 4 MG/ML
INJECTION, SOLUTION INTRA-ARTICULAR; INTRALESIONAL; INTRAMUSCULAR; INTRAVENOUS; SOFT TISSUE AS NEEDED
Status: DISCONTINUED | OUTPATIENT
Start: 2023-10-24 | End: 2023-10-24

## 2023-10-24 RX ORDER — OXYCODONE HYDROCHLORIDE 5 MG/1
5 TABLET ORAL EVERY 4 HOURS PRN
Status: DISCONTINUED | OUTPATIENT
Start: 2023-10-24 | End: 2023-10-24 | Stop reason: HOSPADM

## 2023-10-24 RX ORDER — KETOROLAC TROMETHAMINE 30 MG/ML
INJECTION, SOLUTION INTRAMUSCULAR; INTRAVENOUS AS NEEDED
Status: DISCONTINUED | OUTPATIENT
Start: 2023-10-24 | End: 2023-10-24

## 2023-10-24 RX ORDER — MEPERIDINE HYDROCHLORIDE 25 MG/ML
12.5 INJECTION INTRAMUSCULAR; INTRAVENOUS; SUBCUTANEOUS EVERY 10 MIN PRN
Status: DISCONTINUED | OUTPATIENT
Start: 2023-10-24 | End: 2023-10-24 | Stop reason: HOSPADM

## 2023-10-24 RX ORDER — MIDAZOLAM HYDROCHLORIDE 1 MG/ML
INJECTION INTRAMUSCULAR; INTRAVENOUS AS NEEDED
Status: DISCONTINUED | OUTPATIENT
Start: 2023-10-24 | End: 2023-10-24

## 2023-10-24 RX ORDER — PHENYLEPHRINE HCL IN 0.9% NACL 1 MG/10 ML
SYRINGE (ML) INTRAVENOUS AS NEEDED
Status: DISCONTINUED | OUTPATIENT
Start: 2023-10-24 | End: 2023-10-24

## 2023-10-24 RX ORDER — FENTANYL CITRATE 50 UG/ML
INJECTION, SOLUTION INTRAMUSCULAR; INTRAVENOUS AS NEEDED
Status: DISCONTINUED | OUTPATIENT
Start: 2023-10-24 | End: 2023-10-24

## 2023-10-24 RX ORDER — ONDANSETRON HYDROCHLORIDE 2 MG/ML
INJECTION, SOLUTION INTRAVENOUS AS NEEDED
Status: DISCONTINUED | OUTPATIENT
Start: 2023-10-24 | End: 2023-10-24

## 2023-10-24 RX ORDER — PROPOFOL 10 MG/ML
INJECTION, EMULSION INTRAVENOUS AS NEEDED
Status: DISCONTINUED | OUTPATIENT
Start: 2023-10-24 | End: 2023-10-24

## 2023-10-24 RX ORDER — SULFAMETHOXAZOLE AND TRIMETHOPRIM 800; 160 MG/1; MG/1
1 TABLET ORAL 2 TIMES DAILY
Qty: 6 TABLET | Refills: 0 | Status: SHIPPED | OUTPATIENT
Start: 2023-10-24 | End: 2023-10-27

## 2023-10-24 RX ORDER — ONDANSETRON HYDROCHLORIDE 2 MG/ML
4 INJECTION, SOLUTION INTRAVENOUS ONCE AS NEEDED
Status: DISCONTINUED | OUTPATIENT
Start: 2023-10-24 | End: 2023-10-24 | Stop reason: HOSPADM

## 2023-10-24 RX ORDER — OXYCODONE HYDROCHLORIDE 5 MG/1
5 TABLET ORAL EVERY 6 HOURS PRN
Qty: 4 TABLET | Refills: 0 | Status: SHIPPED | OUTPATIENT
Start: 2023-10-24

## 2023-10-24 RX ADMIN — EPHEDRINE SULFATE 10 MG: 50 INJECTION, SOLUTION INTRAVENOUS at 09:10

## 2023-10-24 RX ADMIN — EPHEDRINE SULFATE 5 MG: 50 INJECTION, SOLUTION INTRAVENOUS at 09:07

## 2023-10-24 RX ADMIN — Medication 50 MCG: at 08:50

## 2023-10-24 RX ADMIN — SODIUM CHLORIDE, SODIUM LACTATE, POTASSIUM CHLORIDE, AND CALCIUM CHLORIDE: 600; 310; 30; 20 INJECTION, SOLUTION INTRAVENOUS at 10:11

## 2023-10-24 RX ADMIN — PROPOFOL 200 MG: 10 INJECTION, EMULSION INTRAVENOUS at 08:44

## 2023-10-24 RX ADMIN — FUROSEMIDE 20 MG: 10 INJECTION, SOLUTION INTRAMUSCULAR; INTRAVENOUS at 10:49

## 2023-10-24 RX ADMIN — FENTANYL CITRATE 50 MCG: 50 INJECTION, SOLUTION INTRAMUSCULAR; INTRAVENOUS at 10:03

## 2023-10-24 RX ADMIN — EPHEDRINE SULFATE 15 MG: 50 INJECTION, SOLUTION INTRAVENOUS at 09:58

## 2023-10-24 RX ADMIN — EPHEDRINE SULFATE 10 MG: 50 INJECTION, SOLUTION INTRAVENOUS at 09:31

## 2023-10-24 RX ADMIN — EPHEDRINE SULFATE 10 MG: 50 INJECTION, SOLUTION INTRAVENOUS at 10:20

## 2023-10-24 RX ADMIN — FENTANYL CITRATE 50 MCG: 50 INJECTION, SOLUTION INTRAMUSCULAR; INTRAVENOUS at 08:44

## 2023-10-24 RX ADMIN — MIDAZOLAM HYDROCHLORIDE 2 MG: 1 INJECTION, SOLUTION INTRAMUSCULAR; INTRAVENOUS at 08:40

## 2023-10-24 RX ADMIN — DEXAMETHASONE SODIUM PHOSPHATE 4 MG: 4 INJECTION, SOLUTION INTRAMUSCULAR; INTRAVENOUS at 08:52

## 2023-10-24 RX ADMIN — ONDANSETRON 4 MG: 2 INJECTION INTRAMUSCULAR; INTRAVENOUS at 10:26

## 2023-10-24 RX ADMIN — GLYCOPYRROLATE 0.2 MG: 0.2 INJECTION, SOLUTION INTRAMUSCULAR; INTRAVITREAL at 08:59

## 2023-10-24 RX ADMIN — CEFAZOLIN 2 G: 1 INJECTION, POWDER, FOR SOLUTION INTRAMUSCULAR; INTRAVENOUS at 08:44

## 2023-10-24 RX ADMIN — KETOROLAC TROMETHAMINE 15 MG: 30 INJECTION, SOLUTION INTRAMUSCULAR; INTRAVENOUS at 10:28

## 2023-10-24 RX ADMIN — Medication 50 MCG: at 08:51

## 2023-10-24 RX ADMIN — Medication 150 MCG: at 09:04

## 2023-10-24 RX ADMIN — Medication 100 MCG: at 08:55

## 2023-10-24 RX ADMIN — LIDOCAINE HYDROCHLORIDE 100 MG: 20 INJECTION, SOLUTION EPIDURAL; INFILTRATION; INTRACAUDAL; PERINEURAL at 08:44

## 2023-10-24 RX ADMIN — FENTANYL CITRATE 50 MCG: 50 INJECTION, SOLUTION INTRAMUSCULAR; INTRAVENOUS at 09:14

## 2023-10-24 RX ADMIN — SODIUM CHLORIDE, SODIUM LACTATE, POTASSIUM CHLORIDE, AND CALCIUM CHLORIDE: 600; 310; 30; 20 INJECTION, SOLUTION INTRAVENOUS at 08:39

## 2023-10-24 RX ADMIN — EPHEDRINE SULFATE 10 MG: 50 INJECTION, SOLUTION INTRAVENOUS at 09:22

## 2023-10-24 RX ADMIN — EPHEDRINE SULFATE 15 MG: 50 INJECTION, SOLUTION INTRAVENOUS at 10:18

## 2023-10-24 RX ADMIN — Medication 6 L/MIN: at 11:16

## 2023-10-24 RX ADMIN — Medication 100 MCG: at 08:58

## 2023-10-24 ASSESSMENT — PAIN SCALES - GENERAL
PAINLEVEL_OUTOF10: 0 - NO PAIN

## 2023-10-24 ASSESSMENT — PAIN - FUNCTIONAL ASSESSMENT
PAIN_FUNCTIONAL_ASSESSMENT: UNABLE TO SELF-REPORT
PAIN_FUNCTIONAL_ASSESSMENT: UNABLE TO SELF-REPORT
PAIN_FUNCTIONAL_ASSESSMENT: 0-10
PAIN_FUNCTIONAL_ASSESSMENT: 0-10

## 2023-10-24 NOTE — DISCHARGE INSTRUCTIONS
DEPARTMENT OF UROLOGY  DISCHARGE INSTRUCTIONS [facility]  Outpatient Surgery    C O N F I D E N T I A L   I N F O R M A T I O N    Kumar Malave        Call [phone] during regular daytime business hours (8:00 am - 5:00 pm) and after 5:00 pm and ask for the Urology resident with any questions or concerns.      If it is a life-threatening situation, proceed to the nearest emergency department.        Follow-up appointment:  3 days for catheter removal    Thank you for the opportunity to care for you today.  Your health and healing are very important to us.  We hope we made you feel as comfortable as possible and are committed to your recovery and continued well-being.      The following is a brief overview of your transurethral prostate resection today. Some of the information contained on this summary may be confidential.  This information should be kept in your records and should be shared with your regular doctor.    Physicians:   Dr. Crook      Procedure performed: Prostate Resection  Pending results:   pathology of tissue taken from your prostate    What to Expect During your Recovery and Home Care  Anesthesia Side Effects   You received anesthesia today.  You may feel sleepy, tired, or have a sore throat.   You may also feel drowsiness, dizziness, or inability to think clearly.  For your safety, do not drive, drink alcoholic beverages, take any unprescribed medication or make any important decisions for 24 hours.  A responsible adult should be with you for 24 hours.        Activity and Recovery    No heavy lifting over ten pounds. Limit activity while urinary catheter is in place. Avoid activities that would cause pulling or tugging on your catheter.    Do not drive or operate heavy machinery while taking narcotic pain medications as these medications can alter perception, impair judgement, and slow reaction times.    Pain Control  Unfortunately, you may experience pain after your procedure.  Adequate management  can include alternative measures to help ease your pain and can include over the counter Tylenol or oxycodone can be taken as prescribed as needed for breakthrough pain. Do not take more than 4,000mg of Tylenol in a 24-hour period.      You may also experience bladder spasms due to the catheter.     Nausea/Vomiting   Clear liquids are best tolerated at first. Start slow, advance your diet as tolerated to normal foods. Avoid spicy, greasy, heavy foods at first. Also, you may feel nauseous or like you need to vomit if you take any type of medication on an empty stomach.  Call your physician if you are unable to eat or drink and have persistent vomiting.    Signs of Bleeding   You are going to have some blood in your urine. Your urine will be light pink to yellow. You always want to look at the urine in the tubing of your catheter and not in the large urine collection bag to check for bleeding. If urine becomes thick dark margi red, has large clots or stops draining, please notify your physician.    Wait until two days after your surgery to resume your blood thinner (Eliquis) medication.    Treatment/wound care:   Keep area(s) clean and dry. Clean around the tip or your penis were the catheter goes in daily with mild soap and water.  It is okay to shower 24 hours after time of surgery.    Do not submerge your catheter in standing water until seen for follow up appointment (no tub bathing, swimming, or hot tubs).      Signs of Infection  Signs of infection can include fever, drainage(green/yellow), chills, burning sensation with passing of urine, catheter leakage, or severe abdominal pain.  If you see any of these occur, please contact your doctor's office at [phone].  Any fever higher than 100.4, especially if associated with an ill feeling, abdominal pain, chills, or nausea should be reported to your surgeon.      Assist in bowel movements/urination  Increase fiber in diet  Increase water (6 to 8  glasses)  Increase walking     Additional Instructions: CATHETER CARE  Always keep the catheters tubing and drainage bag below the level of your bladder.  Avoid loops and kinks in the catheter tubing.  NOTIFY your physician if catheter falls out or catheter seems clogged and urine is not draining.   Do not wear the small leg bag to bed you should be provided with a larger overnight bag that you should wear to bed and can hang over the side of the bed.  We recommend wearing the large bag in the shower, as this is easy to dry, and you do not get your leg straps wet from your leg bag.   Your catheter should be secured to your upper thigh, do not allow it to hang or dangle.  Your catheter will be removed at your post-operative appointment.

## 2023-10-24 NOTE — SIGNIFICANT EVENT
Patient wife at bedside. Discharge instructions reviewed with patient and wife. patient and wife verbalized understanding.

## 2023-10-24 NOTE — H&P
History Of Present Illness  Kumar Malave is a 77 y.o. male presenting with bph and luts.     Past Medical History  Past Medical History:   Diagnosis Date    Cellulitis of left lower limb 06/21/2017    Cellulitis of left leg without foot    Clotting disorder (CMS/HCC)     Prothrombin gene mutation    Contusion of left hip, initial encounter 06/21/2017    Hematoma of hip, left, initial encounter    Coronary artery disease     Elevated prostate specific antigen (PSA) 07/11/2017    PSA elevation    GERD (gastroesophageal reflux disease) 08/29/2023    Hypertension     Nontoxic single thyroid nodule 11/07/2017    Thyroid nodule    Olecranon bursitis, left elbow 08/01/2016    Olecranon bursitis of left elbow    Other amnesia 04/01/2014    Memory change    Other disorders of pituitary gland (CMS/HCC) 07/11/2017    Empty sella syndrome    Other fatigue 11/07/2017    Fatigue    Other forms of dyspnea 10/27/2015    Exertional dyspnea    Other specified disorders of bone density and structure, unspecified site 04/01/2014    Osteopenia    Pain in leg, unspecified 11/06/2014    Leg pain    Paresthesia of skin 03/14/2017    Tingling sensation    Personal history of malignant neoplasm of other organs and systems     History of squamous cell carcinoma    Personal history of pulmonary embolism 08/03/2018    History of pulmonary embolism    Prothrombin gene mutation (CMS/HCC) 03/14/2017    Prothrombin [id number] mutation    Pulmonary embolism (CMS/HCC)     Radiculopathy, lumbosacral region 03/14/2017    Lumbosacral radiculopathy at L5    Syncope and collapse 08/16/2016    Syncope, unspecified syncope type    Unspecified atrial flutter (CMS/HCC) 03/14/2017    Atrial flutter with rapid ventricular response    Unspecified disorder of vestibular function, unspecified ear 04/01/2014    Vestibular dizziness    Venous insufficiency (chronic) (peripheral) 06/21/2017    Venous insufficiency (chronic) (peripheral)       Surgical History  Past  Surgical History:   Procedure Laterality Date    MR HEAD ANGIO WO IV CONTRAST  4/9/2014    MR HEAD ANGIO WO IV CONTRAST 4/9/2014 [facility] CLINICAL LEGACY    MR NECK ANGIO WO IV CONTRAST  4/9/2014    MR NECK ANGIO WO IV CONTRAST 4/9/2014 [facility] CLINICAL LEGACY    OTHER SURGICAL HISTORY  04/01/2014    Previous Stent Placement        Social History  He reports that he has never smoked. He has never used smokeless tobacco. He reports current alcohol use of about 1.0 standard drink of alcohol per week. He reports that he does not use drugs.    Family History  Family History   Problem Relation Name Age of Onset    Coronary artery disease Father      Other (family health problem) Other          Allergies  Patient has no known allergies.    Last Recorded Vitals  There were no vitals taken for this visit.    Relevant Results      Assessment/Plan   Active Problems:  There are no active Hospital Problems.      Plan  OR for holep with botjay Roldan MD

## 2023-10-24 NOTE — ANESTHESIA PREPROCEDURE EVALUATION
Patient: Kumar Malave    Procedure Information       Date/Time: 10/24/23 0830    Procedure: Holimum Laser Enucleation of Small Prostate; Botox ~ 100u    Location: [facility] A OR  / [facility] OR    Surgeons: Marcelino Crook MD            Relevant Problems   Anesthesia (within normal limits)      Cardiovascular   (+) Arteriosclerotic heart disease   (+) Atrial fibrillation (CMS/HCC)   (+) Hyperlipidemia   (+) Hypertension   (+) MI (myocardial infarction) (CMS/HCC) (20y ago)   (+) Pulmonary embolus (CMS/HCC)      Endocrine (within normal limits)      GI   (+) GERD (gastroesophageal reflux disease)      /Renal (within normal limits)      Neuro/Psych   (+) Lumbosacral radiculopathy at L5      Pulmonary   (+) Exertional dyspnea   (+) GHANSHYAM on CPAP   (+) Pulmonary embolus (CMS/HCC)      Hematology  Clotting tendency   (+) Anemia   (+) 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 : N  Date of Last Liquid: 10/24/23  Time of Last Liquid: 0500  Date of Last Solid: 10/23/23  Time of Last Solid: 2100  Last Intake Type: Clear fluids  Time of Last Void: 0719         Physical Exam    Airway  Mallampati: III     Cardiovascular    Dental    Pulmonary    Abdominal            Anesthesia Plan    ASA 3     general     intravenous induction   Anesthetic plan and risks discussed with patient.

## 2023-10-24 NOTE — ANESTHESIA POSTPROCEDURE EVALUATION
Patient: Kumar Malave    Procedure Summary       Date: 10/24/23 Room / Location: [facility] A OR 04 / [facility] OR    Anesthesia Start: 0839 Anesthesia Stop: 1118    Procedure: Holimum Laser Enucleation of Prostate Diagnosis:       Benign prostatic hyperplasia with lower urinary tract symptoms      (Benign prostatic hyperplasia with lower urinary tract symptoms [N40.1])    Surgeons: Marcelino Crook MD Responsible Provider: Layton Dempsey MD    Anesthesia Type: general ASA Status: 3            Anesthesia Type: general    Vitals Value Taken Time   /71 10/24/23 1116   Temp 36.1 °C (97 °F) 10/24/23 1116   Pulse 79 10/24/23 1116   Resp 18 10/24/23 1116   SpO2 100 % 10/24/23 1116       Anesthesia Post Evaluation    Patient location during evaluation: bedside  Patient participation: complete - patient participated  Level of consciousness: awake and alert  Pain management: satisfactory to patient  Airway patency: patent  Cardiovascular status: acceptable  Respiratory status: acceptable  Hydration status: acceptable        No notable events documented.

## 2023-10-24 NOTE — ANESTHESIA PROCEDURE NOTES
Airway  Date/Time: 10/24/2023 8:45 AM  Urgency: elective    Airway not difficult    Staffing  Performed: AWAIS   Authorized by: Layton Dempsey MD    Performed by: PEGGY Dowling  Patient location during procedure: OR    Indications and Patient Condition  Indications for airway management: anesthesia  Spontaneous Ventilation: absent  Sedation level: deep  Preoxygenated: yes  Patient position: sniffing  Mask difficulty assessment: 0 - not attempted    Final Airway Details  Final airway type: supraglottic airway      Successful airway: Supraglottic airway: i gel.  Size 4     Number of attempts at approach: 1

## 2023-10-25 ASSESSMENT — PAIN SCALES - GENERAL: PAINLEVEL_OUTOF10: 0 - NO PAIN

## 2023-10-26 ENCOUNTER — OFFICE VISIT (OUTPATIENT)
Dept: UROLOGY | Facility: HOSPITAL | Age: 78
End: 2023-10-26
Payer: MEDICARE

## 2023-10-26 ENCOUNTER — LAB (OUTPATIENT)
Dept: LAB | Facility: LAB | Age: 78
End: 2023-10-26
Payer: MEDICARE

## 2023-10-26 DIAGNOSIS — N13.8 BPH WITH URINARY OBSTRUCTION: Primary | ICD-10-CM

## 2023-10-26 DIAGNOSIS — N40.1 BPH WITH URINARY OBSTRUCTION: Primary | ICD-10-CM

## 2023-10-26 DIAGNOSIS — D51.8 OTHER VITAMIN B12 DEFICIENCY ANEMIAS: Primary | ICD-10-CM

## 2023-10-26 LAB
BASOPHILS # BLD AUTO: 0.05 X10*3/UL (ref 0–0.1)
BASOPHILS NFR BLD AUTO: 0.6 %
EOSINOPHIL # BLD AUTO: 0.2 X10*3/UL (ref 0–0.4)
EOSINOPHIL NFR BLD AUTO: 2.5 %
ERYTHROCYTE [DISTWIDTH] IN BLOOD BY AUTOMATED COUNT: 13.9 % (ref 11.5–14.5)
HCT VFR BLD AUTO: 36.7 % (ref 41–52)
HGB BLD-MCNC: 11.9 G/DL (ref 13.5–17.5)
IMM GRANULOCYTES # BLD AUTO: 0.02 X10*3/UL (ref 0–0.5)
IMM GRANULOCYTES NFR BLD AUTO: 0.3 % (ref 0–0.9)
IRON SATN MFR SERPL: 17 % (ref 25–45)
IRON SERPL-MCNC: 60 UG/DL (ref 35–150)
LYMPHOCYTES # BLD AUTO: 0.82 X10*3/UL (ref 0.8–3)
LYMPHOCYTES NFR BLD AUTO: 10.4 %
MCH RBC QN AUTO: 28.3 PG (ref 26–34)
MCHC RBC AUTO-ENTMCNC: 32.4 G/DL (ref 32–36)
MCV RBC AUTO: 87 FL (ref 80–100)
MONOCYTES # BLD AUTO: 0.55 X10*3/UL (ref 0.05–0.8)
MONOCYTES NFR BLD AUTO: 7 %
NEUTROPHILS # BLD AUTO: 6.25 X10*3/UL (ref 1.6–5.5)
NEUTROPHILS NFR BLD AUTO: 79.2 %
NRBC BLD-RTO: 0 /100 WBCS (ref 0–0)
PLATELET # BLD AUTO: 193 X10*3/UL (ref 150–450)
PMV BLD AUTO: 10.1 FL (ref 7.5–11.5)
RBC # BLD AUTO: 4.2 X10*6/UL (ref 4.5–5.9)
TIBC SERPL-MCNC: 344 UG/DL (ref 240–445)
UIBC SERPL-MCNC: 284 UG/DL (ref 110–370)
VIT B12 SERPL-MCNC: 245 PG/ML (ref 211–911)
WBC # BLD AUTO: 7.9 X10*3/UL (ref 4.4–11.3)

## 2023-10-26 PROCEDURE — 1036F TOBACCO NON-USER: CPT | Performed by: UROLOGY

## 2023-10-26 PROCEDURE — 1159F MED LIST DOCD IN RCRD: CPT | Performed by: UROLOGY

## 2023-10-26 PROCEDURE — 36415 COLL VENOUS BLD VENIPUNCTURE: CPT

## 2023-10-26 PROCEDURE — 83540 ASSAY OF IRON: CPT

## 2023-10-26 PROCEDURE — 99024 POSTOP FOLLOW-UP VISIT: CPT | Performed by: UROLOGY

## 2023-10-26 PROCEDURE — 82607 VITAMIN B-12: CPT

## 2023-10-26 PROCEDURE — 85025 COMPLETE CBC W/AUTO DIFF WBC: CPT

## 2023-10-26 PROCEDURE — 1126F AMNT PAIN NOTED NONE PRSNT: CPT | Performed by: UROLOGY

## 2023-10-26 PROCEDURE — 83550 IRON BINDING TEST: CPT

## 2023-10-26 NOTE — PROGRESS NOTES
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     2 prior bx, neg. About 3 years ago had an episode of retention, resolved, but then had a brief period of SIC. Now with freq, urg, slow stream, dysuria. No hematuria, no documented UTIs. On elliquis. On flomax, daily tadalafil for LUTS, on sildenafil prn for erections, works reasonably well with meds. Would like something done surgically after golf season in late september.     6/7/23- Patient has weak stream at times, urgency and frequency. He is on Eliquis and Aspirin medications. Cannot stop aspirin per his report. PVR today 35cc         10/24/23 - outpatient HoLEP         10/26/2023 - seen today for tov. 300cc in, 300cc out. No issues since surgery    Current Medications:  Current Outpatient Medications   Medication Sig Dispense Refill    alendronate (Fosamax) 70 mg tablet Take 1 tablet (70 mg) by mouth every 7 days. Take in the morning with a full glass of water, on an empty stomach, and do not take anything else by mouth or lie down for the next 30 min.      amLODIPine (Norvasc) 5 mg tablet Take 1 tablet (5 mg) by mouth once daily.      apixaban (Eliquis) 2.5 mg tablet Take 1 tablet (2.5 mg) by mouth 2 times a day.      aspirin-calcium carbonate 81 mg-300 mg calcium(777 mg) tablet Take 1 tablet by mouth once daily.      atorvastatin (Lipitor) 20 mg tablet Take 1 tablet (20 mg) by mouth once daily.      azelastine (Astelin) 137 mcg (0.1 %) nasal spray Administer 1 spray into each nostril 2 times a day. Use in each nostril as directed      celecoxib (CeleBREX) 200 mg capsule Take by mouth.      digoxin (Lanoxin) 125 MCG tablet Take 1 tablet (125 mcg) by mouth once daily.      docusate sodium (Colace) 100 mg tablet Take 2 tablets (200 mg) by mouth once daily.      esomeprazole (NexIUM) 40 mg DR capsule 1 po 1-2 times daily, before breakfast and dinner, as directed 60 capsule  11    ipratropium (Atrovent) 42 mcg (0.06 %) nasal spray Administer into affected nostril(s). USE 2 SPRAYS IN EACH NOSTRIL 2 TO 3 TIMES DAILY.      losartan (Cozaar) 50 mg tablet Take 1 tablet (50 mg) by mouth once daily.      magnesium, amino acid chelate, 133 mg tablet Take 1 tablet (133 mg) by mouth 2 times a day.      modafinil (ProvigiL) 200 mg tablet Take 1 tablet (200 mg) by mouth 2 times a day.      niacin (Niaspan Extended-Release) 500 mg ER tablet Take 1 tablet (500 mg) by mouth once daily.      oxyCODONE (Roxicodone) 5 mg immediate release tablet Take 1 tablet (5 mg) by mouth every 6 hours if needed for severe pain (7 - 10) for up to 4 doses. 4 tablet 0    phenazopyridine (Pyridium) 200 mg tablet Take 1 tablet (200 mg) by mouth 3 times a day with meals for 3 days. 9 tablet 0    sildenafil (Viagra) 100 mg tablet Take by mouth. TAKE 1 TABLET DAILY 1 HOUR BEFORE NEEDED      sulfamethoxazole-trimethoprim (Bactrim DS) 800-160 mg tablet Take 1 tablet by mouth 2 times a day for 3 days. 6 tablet 0    tadalafil (Cialis) 5 mg tablet Take 1 tablet (5 mg) by mouth once daily.      ubidecarenone (coenzyme Q10) 100 mg tablet Take 1 tablet by mouth once daily.       No current facility-administered medications for this visit.        Active Problems:  Kumar Malave is a 77 y.o. male with the following Problems and Medications.  Patient Active Problem List   Diagnosis    Allergic rhinitis    Arteriosclerotic heart disease    Atrial fibrillation (CMS/HCC)    Empty sella syndrome (CMS/HCC)    Eructation    Exertional dyspnea    GERD (gastroesophageal reflux disease)    Hyperlipidemia    Hypertension    Lumbosacral radiculopathy at L5    Memory change    Olecranon bursitis of left elbow    GHANSHYAM on CPAP    Osteopenia    Prothrombin [id number] mutation (CMS/HCC)    PSA elevation    Thyroid nodule    Venous insufficiency (chronic) (peripheral)    Pulmonary embolus (CMS/HCC)    MI (myocardial infarction) (CMS/HCC)    Anemia      Current Outpatient Medications   Medication Sig Dispense Refill    alendronate (Fosamax) 70 mg tablet Take 1 tablet (70 mg) by mouth every 7 days. Take in the morning with a full glass of water, on an empty stomach, and do not take anything else by mouth or lie down for the next 30 min.      amLODIPine (Norvasc) 5 mg tablet Take 1 tablet (5 mg) by mouth once daily.      apixaban (Eliquis) 2.5 mg tablet Take 1 tablet (2.5 mg) by mouth 2 times a day.      aspirin-calcium carbonate 81 mg-300 mg calcium(777 mg) tablet Take 1 tablet by mouth once daily.      atorvastatin (Lipitor) 20 mg tablet Take 1 tablet (20 mg) by mouth once daily.      azelastine (Astelin) 137 mcg (0.1 %) nasal spray Administer 1 spray into each nostril 2 times a day. Use in each nostril as directed      celecoxib (CeleBREX) 200 mg capsule Take by mouth.      digoxin (Lanoxin) 125 MCG tablet Take 1 tablet (125 mcg) by mouth once daily.      docusate sodium (Colace) 100 mg tablet Take 2 tablets (200 mg) by mouth once daily.      esomeprazole (NexIUM) 40 mg DR capsule 1 po 1-2 times daily, before breakfast and dinner, as directed 60 capsule 11    ipratropium (Atrovent) 42 mcg (0.06 %) nasal spray Administer into affected nostril(s). USE 2 SPRAYS IN EACH NOSTRIL 2 TO 3 TIMES DAILY.      losartan (Cozaar) 50 mg tablet Take 1 tablet (50 mg) by mouth once daily.      magnesium, amino acid chelate, 133 mg tablet Take 1 tablet (133 mg) by mouth 2 times a day.      modafinil (ProvigiL) 200 mg tablet Take 1 tablet (200 mg) by mouth 2 times a day.      niacin (Niaspan Extended-Release) 500 mg ER tablet Take 1 tablet (500 mg) by mouth once daily.      oxyCODONE (Roxicodone) 5 mg immediate release tablet Take 1 tablet (5 mg) by mouth every 6 hours if needed for severe pain (7 - 10) for up to 4 doses. 4 tablet 0    phenazopyridine (Pyridium) 200 mg tablet Take 1 tablet (200 mg) by mouth 3 times a day with meals for 3 days. 9 tablet 0    sildenafil (Viagra)  100 mg tablet Take by mouth. TAKE 1 TABLET DAILY 1 HOUR BEFORE NEEDED      sulfamethoxazole-trimethoprim (Bactrim DS) 800-160 mg tablet Take 1 tablet by mouth 2 times a day for 3 days. 6 tablet 0    tadalafil (Cialis) 5 mg tablet Take 1 tablet (5 mg) by mouth once daily.      ubidecarenone (coenzyme Q10) 100 mg tablet Take 1 tablet by mouth once daily.       No current facility-administered medications for this visit.       PMH:  Past Medical History:   Diagnosis Date    Cellulitis of left lower limb 06/21/2017    Cellulitis of left leg without foot    Clotting disorder (CMS/HCC)     Prothrombin gene mutation    Contusion of left hip, initial encounter 06/21/2017    Hematoma of hip, left, initial encounter    Coronary artery disease     Elevated prostate specific antigen (PSA) 07/11/2017    PSA elevation    GERD (gastroesophageal reflux disease) 08/29/2023    Hypertension     Nontoxic single thyroid nodule 11/07/2017    Thyroid nodule    Olecranon bursitis, left elbow 08/01/2016    Olecranon bursitis of left elbow    Other amnesia 04/01/2014    Memory change    Other disorders of pituitary gland (CMS/HCC) 07/11/2017    Empty sella syndrome    Other fatigue 11/07/2017    Fatigue    Other forms of dyspnea 10/27/2015    Exertional dyspnea    Other specified disorders of bone density and structure, unspecified site 04/01/2014    Osteopenia    Pain in leg, unspecified 11/06/2014    Leg pain    Paresthesia of skin 03/14/2017    Tingling sensation    Personal history of malignant neoplasm of other organs and systems     History of squamous cell carcinoma    Personal history of pulmonary embolism 08/03/2018    History of pulmonary embolism    Prothrombin gene mutation (CMS/HCC) 03/14/2017    Prothrombin [id number] mutation    Pulmonary embolism (CMS/HCC)     Radiculopathy, lumbosacral region 03/14/2017    Lumbosacral radiculopathy at L5    Syncope and collapse 08/16/2016    Syncope, unspecified syncope type    Unspecified  atrial flutter (CMS/HCC) 03/14/2017    Atrial flutter with rapid ventricular response    Unspecified disorder of vestibular function, unspecified ear 04/01/2014    Vestibular dizziness    Venous insufficiency (chronic) (peripheral) 06/21/2017    Venous insufficiency (chronic) (peripheral)       PSH:  Past Surgical History:   Procedure Laterality Date    MR HEAD ANGIO WO IV CONTRAST  4/9/2014    MR HEAD ANGIO WO IV CONTRAST 4/9/2014 [facility] CLINICAL LEGACY    MR NECK ANGIO WO IV CONTRAST  4/9/2014    MR NECK ANGIO WO IV CONTRAST 4/9/2014 [facility] CLINICAL LEGACY    OTHER SURGICAL HISTORY  04/01/2014    Previous Stent Placement       FMH:  Family History   Problem Relation Name Age of Onset    Coronary artery disease Father      Other (family health problem) Other         SHx:  Social History     Tobacco Use    Smoking status: Never    Smokeless tobacco: Never   Vaping Use    Vaping Use: Never used   Substance Use Topics    Alcohol use: Yes     Alcohol/week: 1.0 standard drink of alcohol     Types: 1 Cans of beer per week    Drug use: Never       Allergies:  No Known Allergies      Assesment/Plan    Successful TOV. Discussed postop precautions, expectations. Will see in 2 weeks for PVR check, sooner prn    Scribe Attestation  By signing my name below, I, Leonor Nam , Scribe   attest that this documentation has been prepared under the direction and in the presence of Marcelino Crook MD.

## 2023-10-27 ENCOUNTER — TELEPHONE (OUTPATIENT)
Dept: GASTROENTEROLOGY | Facility: CLINIC | Age: 78
End: 2023-10-27
Payer: MEDICARE

## 2023-10-27 NOTE — TELEPHONE ENCOUNTER
Prostate surgery went well he feels.  Still having pains in his abdomen, chest, sometimes down his arm to his hand.  Has been taking the Nexium twice daily.  I explained that I do not think the symptoms likely to have a GI origin in general as they are --  A.  Overall way to wide an area to be likely to have a GI source (e.g. down the arm) and  B.  Showing no response to acid suppressing medicines which makes it unlikely to be reflux related or an ulcer.  We are scheduled for EGD colonoscopy.  Does have mild iron deficiency anemia.  Advised him to discuss with his PCP.

## 2023-10-27 NOTE — TELEPHONE ENCOUNTER
Mr. Malave would like to speak with you to see what can be done. He has a low blood count and is still experiencing pain.

## 2023-10-30 ENCOUNTER — HOSPITAL ENCOUNTER (EMERGENCY)
Facility: HOSPITAL | Age: 78
Discharge: HOME | End: 2023-10-30
Payer: MEDICARE

## 2023-10-30 ENCOUNTER — TELEPHONE (OUTPATIENT)
Dept: UROLOGY | Facility: CLINIC | Age: 78
End: 2023-10-30

## 2023-10-30 VITALS
RESPIRATION RATE: 16 BRPM | HEART RATE: 82 BPM | BODY MASS INDEX: 26.48 KG/M2 | OXYGEN SATURATION: 95 % | SYSTOLIC BLOOD PRESSURE: 131 MMHG | TEMPERATURE: 97.9 F | DIASTOLIC BLOOD PRESSURE: 82 MMHG | WEIGHT: 189.15 LBS | HEIGHT: 71 IN

## 2023-10-30 DIAGNOSIS — N30.91 CYSTITIS WITH HEMATURIA: Primary | ICD-10-CM

## 2023-10-30 LAB
ALBUMIN SERPL BCP-MCNC: 4.1 G/DL (ref 3.4–5)
ALP SERPL-CCNC: 73 U/L (ref 33–136)
ALT SERPL W P-5'-P-CCNC: 26 U/L (ref 10–52)
ANION GAP SERPL CALC-SCNC: 14 MMOL/L (ref 10–20)
APPEARANCE UR: ABNORMAL
APTT PPP: 26 SECONDS (ref 22–32.5)
AST SERPL W P-5'-P-CCNC: 15 U/L (ref 9–39)
BACTERIA #/AREA URNS AUTO: ABNORMAL /HPF
BASOPHILS # BLD AUTO: 0.02 X10*3/UL (ref 0–0.1)
BASOPHILS NFR BLD AUTO: 0.2 %
BILIRUB SERPL-MCNC: 0.3 MG/DL (ref 0–1.2)
BILIRUB UR STRIP.AUTO-MCNC: NEGATIVE MG/DL
BUN SERPL-MCNC: 22 MG/DL (ref 6–23)
CALCIUM SERPL-MCNC: 8.9 MG/DL (ref 8.6–10.3)
CHLORIDE SERPL-SCNC: 104 MMOL/L (ref 98–107)
CO2 SERPL-SCNC: 24 MMOL/L (ref 21–32)
COLOR UR: ABNORMAL
CREAT SERPL-MCNC: 1.15 MG/DL (ref 0.5–1.3)
EOSINOPHIL # BLD AUTO: 0.27 X10*3/UL (ref 0–0.4)
EOSINOPHIL NFR BLD AUTO: 2.8 %
ERYTHROCYTE [DISTWIDTH] IN BLOOD BY AUTOMATED COUNT: 13.8 % (ref 11.5–14.5)
GFR SERPL CREATININE-BSD FRML MDRD: 66 ML/MIN/1.73M*2
GLUCOSE SERPL-MCNC: 152 MG/DL (ref 74–99)
GLUCOSE UR STRIP.AUTO-MCNC: ABNORMAL MG/DL
HCT VFR BLD AUTO: 39.7 % (ref 41–52)
HGB BLD-MCNC: 13 G/DL (ref 13.5–17.5)
IMM GRANULOCYTES # BLD AUTO: 0.02 X10*3/UL (ref 0–0.5)
IMM GRANULOCYTES NFR BLD AUTO: 0.2 % (ref 0–0.9)
INR PPP: 1 (ref 0.9–1.2)
KETONES UR STRIP.AUTO-MCNC: ABNORMAL MG/DL
LEUKOCYTE ESTERASE UR QL STRIP.AUTO: ABNORMAL
LYMPHOCYTES # BLD AUTO: 2.06 X10*3/UL (ref 0.8–3)
LYMPHOCYTES NFR BLD AUTO: 21.5 %
MCH RBC QN AUTO: 28.8 PG (ref 26–34)
MCHC RBC AUTO-ENTMCNC: 32.7 G/DL (ref 32–36)
MCV RBC AUTO: 88 FL (ref 80–100)
MONOCYTES # BLD AUTO: 0.86 X10*3/UL (ref 0.05–0.8)
MONOCYTES NFR BLD AUTO: 9 %
NEUTROPHILS # BLD AUTO: 6.35 X10*3/UL (ref 1.6–5.5)
NEUTROPHILS NFR BLD AUTO: 66.3 %
NITRITE UR QL STRIP.AUTO: ABNORMAL
NRBC BLD-RTO: ABNORMAL /100{WBCS}
PH UR STRIP.AUTO: 6.5 [PH]
PLATELET # BLD AUTO: 277 X10*3/UL (ref 150–450)
PMV BLD AUTO: 9.7 FL (ref 7.5–11.5)
POTASSIUM SERPL-SCNC: 4 MMOL/L (ref 3.5–5.3)
PROT SERPL-MCNC: 6.9 G/DL (ref 6.4–8.2)
PROT UR STRIP.AUTO-MCNC: ABNORMAL MG/DL
PROTHROMBIN TIME: 9.5 SECONDS (ref 9.3–12.7)
RBC # BLD AUTO: 4.52 X10*6/UL (ref 4.5–5.9)
RBC # UR STRIP.AUTO: ABNORMAL /UL
RBC #/AREA URNS AUTO: >20 /HPF
SODIUM SERPL-SCNC: 138 MMOL/L (ref 136–145)
SP GR UR STRIP.AUTO: 1.02
UROBILINOGEN UR STRIP.AUTO-MCNC: ABNORMAL MG/DL
WBC # BLD AUTO: 9.6 X10*3/UL (ref 4.4–11.3)
WBC #/AREA URNS AUTO: ABNORMAL /HPF

## 2023-10-30 PROCEDURE — 87086 URINE CULTURE/COLONY COUNT: CPT | Mod: BEALAB | Performed by: STUDENT IN AN ORGANIZED HEALTH CARE EDUCATION/TRAINING PROGRAM

## 2023-10-30 PROCEDURE — 99283 EMERGENCY DEPT VISIT LOW MDM: CPT | Mod: 25

## 2023-10-30 PROCEDURE — 2500000001 HC RX 250 WO HCPCS SELF ADMINISTERED DRUGS (ALT 637 FOR MEDICARE OP): Performed by: STUDENT IN AN ORGANIZED HEALTH CARE EDUCATION/TRAINING PROGRAM

## 2023-10-30 PROCEDURE — 94760 N-INVAS EAR/PLS OXIMETRY 1: CPT

## 2023-10-30 PROCEDURE — 85025 COMPLETE CBC W/AUTO DIFF WBC: CPT | Performed by: STUDENT IN AN ORGANIZED HEALTH CARE EDUCATION/TRAINING PROGRAM

## 2023-10-30 PROCEDURE — 36415 COLL VENOUS BLD VENIPUNCTURE: CPT | Performed by: STUDENT IN AN ORGANIZED HEALTH CARE EDUCATION/TRAINING PROGRAM

## 2023-10-30 PROCEDURE — 85610 PROTHROMBIN TIME: CPT | Performed by: STUDENT IN AN ORGANIZED HEALTH CARE EDUCATION/TRAINING PROGRAM

## 2023-10-30 PROCEDURE — 81001 URINALYSIS AUTO W/SCOPE: CPT | Performed by: STUDENT IN AN ORGANIZED HEALTH CARE EDUCATION/TRAINING PROGRAM

## 2023-10-30 PROCEDURE — 80053 COMPREHEN METABOLIC PANEL: CPT | Performed by: STUDENT IN AN ORGANIZED HEALTH CARE EDUCATION/TRAINING PROGRAM

## 2023-10-30 PROCEDURE — 85730 THROMBOPLASTIN TIME PARTIAL: CPT | Performed by: STUDENT IN AN ORGANIZED HEALTH CARE EDUCATION/TRAINING PROGRAM

## 2023-10-30 RX ORDER — GRANULES FOR ORAL 3 G/1
3 POWDER ORAL ONCE
Status: DISCONTINUED | OUTPATIENT
Start: 2023-10-30 | End: 2023-10-30

## 2023-10-30 RX ORDER — PHENAZOPYRIDINE HYDROCHLORIDE 100 MG/1
95 TABLET, FILM COATED ORAL ONCE
Status: COMPLETED | OUTPATIENT
Start: 2023-10-30 | End: 2023-10-30

## 2023-10-30 RX ORDER — NITROFURANTOIN 25; 75 MG/1; MG/1
100 CAPSULE ORAL ONCE
Status: DISCONTINUED | OUTPATIENT
Start: 2023-10-30 | End: 2023-10-30

## 2023-10-30 RX ORDER — PHENAZOPYRIDINE HYDROCHLORIDE 95 MG/1
95 TABLET ORAL 3 TIMES DAILY PRN
COMMUNITY
End: 2023-10-30

## 2023-10-30 RX ORDER — ERGOCALCIFEROL 1.25 MG/1
1 CAPSULE ORAL
COMMUNITY

## 2023-10-30 RX ORDER — ARMODAFINIL 250 MG/1
250 TABLET ORAL DAILY PRN
COMMUNITY
Start: 2018-10-01

## 2023-10-30 RX ORDER — NITROFURANTOIN 25; 75 MG/1; MG/1
100 CAPSULE ORAL 2 TIMES DAILY
Qty: 10 CAPSULE | Refills: 0 | Status: SHIPPED | OUTPATIENT
Start: 2023-10-30 | End: 2023-10-30 | Stop reason: ALTCHOICE

## 2023-10-30 RX ORDER — ASPIRIN 81 MG/1
81 TABLET ORAL
COMMUNITY

## 2023-10-30 RX ORDER — NITROFURANTOIN 25; 75 MG/1; MG/1
100 CAPSULE ORAL 2 TIMES DAILY
Qty: 10 CAPSULE | Refills: 0 | Status: SHIPPED | OUTPATIENT
Start: 2023-10-30 | End: 2023-11-04

## 2023-10-30 RX ORDER — PHENAZOPYRIDINE HYDROCHLORIDE 200 MG/1
200 TABLET, FILM COATED ORAL 3 TIMES DAILY
Qty: 3 TABLET | Refills: 0 | Status: SHIPPED | OUTPATIENT
Start: 2023-10-30 | End: 2023-10-31

## 2023-10-30 RX ORDER — PHENAZOPYRIDINE HYDROCHLORIDE 100 MG/1
100 TABLET, FILM COATED ORAL ONCE
Status: COMPLETED | OUTPATIENT
Start: 2023-10-30 | End: 2023-10-30

## 2023-10-30 RX ADMIN — PHENAZOPYRIDINE 100 MG: 100 TABLET ORAL at 23:12

## 2023-10-30 RX ADMIN — PHENAZOPYRIDINE 100 MG: 100 TABLET ORAL at 23:17

## 2023-10-30 ASSESSMENT — PAIN - FUNCTIONAL ASSESSMENT: PAIN_FUNCTIONAL_ASSESSMENT: 0-10

## 2023-10-30 ASSESSMENT — COLUMBIA-SUICIDE SEVERITY RATING SCALE - C-SSRS
2. HAVE YOU ACTUALLY HAD ANY THOUGHTS OF KILLING YOURSELF?: NO
6. HAVE YOU EVER DONE ANYTHING, STARTED TO DO ANYTHING, OR PREPARED TO DO ANYTHING TO END YOUR LIFE?: NO
1. IN THE PAST MONTH, HAVE YOU WISHED YOU WERE DEAD OR WISHED YOU COULD GO TO SLEEP AND NOT WAKE UP?: NO

## 2023-10-30 ASSESSMENT — PAIN SCALES - GENERAL
PAINLEVEL_OUTOF10: 0 - NO PAIN
PAINLEVEL_OUTOF10: 0 - NO PAIN

## 2023-10-30 NOTE — TELEPHONE ENCOUNTER
Pt called in this morning about having blood in urine/burning. Had surgery 10/24. Transferred over to nurse to leave a more detailed message with what is going on  Call back number is [phone]

## 2023-10-31 LAB
LABORATORY COMMENT REPORT: NORMAL
PATH REPORT.FINAL DX SPEC: NORMAL
PATH REPORT.GROSS SPEC: NORMAL
PATH REPORT.RELEVANT HX SPEC: NORMAL
PATH REPORT.TOTAL CANCER: NORMAL

## 2023-10-31 NOTE — ED PROVIDER NOTES
HPI   Chief Complaint   Patient presents with    Anemia     Worried about anemia/low blood count on anti-coags.    Blood in Urine     Patient had prostate surgery/procedure on Tuesday, was off Eliquis for 3 days prior but continued to take ASA 81mg. Patient restarted Eliquis 10/27. Patient has had increasingly worsening bloody urine since procedure.          This is a 78-year-old male who underwent HoLEP on 10/24/2023, resumed his Eliquis 3 days ago, and started having hematuria and dysuria a day and a half ago.  States that he feels more fatigued, but otherwise has no complaints aside from the urinary complaints mentioned.  Denies fevers, flank pain, abdominal pain, nausea/vomiting, lightheadedness/syncope, chest pain, or shortness of breath.  He called his surgeon's office today but was unable to speak with him, so came to the ED for evaluation tonight.  Denies any urinary retention or passage of blood clots.  Of note, patient states that he has a hypercoagulable state and was thought to potentially have pulmonary emboli approximately 15 years ago.  His hematologist did not feel that he needed to be anticoagulated, but it seems he is on Eliquis due to A-fib.  Patient states that he had an ablation 8 years ago and has not been in A-fib since.      Review of Systems   All other systems reviewed and are negative.                        Jodee Coma Scale Score: 15                  Patient History   Past Medical History:   Diagnosis Date    Cellulitis of left lower limb 06/21/2017    Cellulitis of left leg without foot    Clotting disorder (CMS/HCC)     Prothrombin gene mutation    Contusion of left hip, initial encounter 06/21/2017    Hematoma of hip, left, initial encounter    Coronary artery disease     Elevated prostate specific antigen (PSA) 07/11/2017    PSA elevation    GERD (gastroesophageal reflux disease) 08/29/2023    Hypertension     Nontoxic single thyroid nodule 11/07/2017    Thyroid nodule    Olecranon  bursitis, left elbow 08/01/2016    Olecranon bursitis of left elbow    Other amnesia 04/01/2014    Memory change    Other disorders of pituitary gland (CMS/[facility]) 07/11/2017    Empty sella syndrome    Other fatigue 11/07/2017    Fatigue    Other forms of dyspnea 10/27/2015    Exertional dyspnea    Other specified disorders of bone density and structure, unspecified site 04/01/2014    Osteopenia    Pain in leg, unspecified 11/06/2014    Leg pain    Paresthesia of skin 03/14/2017    Tingling sensation    Personal history of malignant neoplasm of other organs and systems     History of squamous cell carcinoma    Personal history of pulmonary embolism 08/03/2018    History of pulmonary embolism    Prothrombin gene mutation (CMS/[facility]) 03/14/2017    Prothrombin [id number] mutation    Pulmonary embolism (CMS/[facility])     Radiculopathy, lumbosacral region 03/14/2017    Lumbosacral radiculopathy at L5    Syncope and collapse 08/16/2016    Syncope, unspecified syncope type    Unspecified atrial flutter (CMS/[facility]) 03/14/2017    Atrial flutter with rapid ventricular response    Unspecified disorder of vestibular function, unspecified ear 04/01/2014    Vestibular dizziness    Venous insufficiency (chronic) (peripheral) 06/21/2017    Venous insufficiency (chronic) (peripheral)     Past Surgical History:   Procedure Laterality Date    MR HEAD ANGIO WO IV CONTRAST  4/9/2014    MR HEAD ANGIO WO IV CONTRAST 4/9/2014 [facility] CLINICAL LEGACY    MR NECK ANGIO WO IV CONTRAST  4/9/2014    MR NECK ANGIO WO IV CONTRAST 4/9/2014 [facility] CLINICAL LEGACY    OTHER SURGICAL HISTORY  04/01/2014    Previous Stent Placement     Family History   Problem Relation Name Age of Onset    Coronary artery disease Father      Other (family health problem) Other       Social History     Tobacco Use    Smoking status: Never    Smokeless tobacco: Never   Vaping Use    Vaping Use: Never used   Substance Use Topics    Alcohol use: Yes     Alcohol/week: 1.0 standard drink of  alcohol     Types: 1 Cans of beer per week    Drug use: Never       Physical Exam   ED Triage Vitals [10/30/23 2134]   Temp Heart Rate Resp BP   36.6 °C (97.9 °F) 88 16 (!) 139/97      SpO2 Temp Source Heart Rate Source Patient Position   95 % Oral Monitor Lying      BP Location FiO2 (%)     Left arm --       Physical Exam  Vitals and nursing note reviewed.   Constitutional:       General: He is not in acute distress.     Appearance: He is well-developed.   HENT:      Head: Normocephalic and atraumatic.      Nose: Nose normal.      Mouth/Throat:      Mouth: Mucous membranes are moist.      Pharynx: Oropharynx is clear.   Eyes:      Conjunctiva/sclera: Conjunctivae normal.   Pulmonary:      Effort: Pulmonary effort is normal. No respiratory distress.   Musculoskeletal:         General: No swelling.      Cervical back: Neck supple.   Skin:     General: Skin is warm and dry.   Neurological:      General: No focal deficit present.      Mental Status: He is alert and oriented to person, place, and time.   Psychiatric:         Mood and Affect: Mood normal.       Results for orders placed or performed during the hospital encounter of 10/30/23 (from the past 24 hour(s))   CBC and Auto Differential   Result Value Ref Range    WBC 9.6 4.4 - 11.3 x10*3/uL    nRBC      RBC 4.52 4.50 - 5.90 x10*6/uL    Hemoglobin 13.0 (L) 13.5 - 17.5 g/dL    Hematocrit 39.7 (L) 41.0 - 52.0 %    MCV 88 80 - 100 fL    MCH 28.8 26.0 - 34.0 pg    MCHC 32.7 32.0 - 36.0 g/dL    RDW 13.8 11.5 - 14.5 %    Platelets 277 150 - 450 x10*3/uL    MPV 9.7 7.5 - 11.5 fL    Neutrophils % 66.3 40.0 - 80.0 %    Immature Granulocytes %, Automated 0.2 0.0 - 0.9 %    Lymphocytes % 21.5 13.0 - 44.0 %    Monocytes % 9.0 2.0 - 10.0 %    Eosinophils % 2.8 0.0 - 6.0 %    Basophils % 0.2 0.0 - 2.0 %    Neutrophils Absolute 6.35 (H) 1.60 - 5.50 x10*3/uL    Immature Granulocytes Absolute, Automated 0.02 0.00 - 0.50 x10*3/uL    Lymphocytes Absolute 2.06 0.80 - 3.00 x10*3/uL     Monocytes Absolute 0.86 (H) 0.05 - 0.80 x10*3/uL    Eosinophils Absolute 0.27 0.00 - 0.40 x10*3/uL    Basophils Absolute 0.02 0.00 - 0.10 x10*3/uL   Comprehensive metabolic panel   Result Value Ref Range    Glucose 152 (H) 74 - 99 mg/dL    Sodium 138 136 - 145 mmol/L    Potassium 4.0 3.5 - 5.3 mmol/L    Chloride 104 98 - 107 mmol/L    Bicarbonate 24 21 - 32 mmol/L    Anion Gap 14 10 - 20 mmol/L    Urea Nitrogen 22 6 - 23 mg/dL    Creatinine 1.15 0.50 - 1.30 mg/dL    eGFR 66 >60 mL/min/1.73m*2    Calcium 8.9 8.6 - 10.3 mg/dL    Albumin 4.1 3.4 - 5.0 g/dL    Alkaline Phosphatase 73 33 - 136 U/L    Total Protein 6.9 6.4 - 8.2 g/dL    AST 15 9 - 39 U/L    Bilirubin, Total 0.3 0.0 - 1.2 mg/dL    ALT 26 10 - 52 U/L   Protime-INR   Result Value Ref Range    Protime 9.5 9.3 - 12.7 seconds    INR 1.0 0.9 - 1.2   APTT   Result Value Ref Range    aPTT 26.0 22.0 - 32.5 seconds   Urinalysis with Reflex Microscopic and Culture   Result Value Ref Range    Color, Urine Red (N) Straw, Yellow    Appearance, Urine Turbid (N) Clear    Specific Gravity, Urine 1.020 1.005 - 1.035    pH, Urine 6.5 5.0, 5.5, 6.0, 6.5, 7.0, 7.5, 8.0    Protein, Urine 300 (3+) (A) NEGATIVE, 10 (TRACE) mg/dL    Glucose, Urine 300 (3+) (A) NEGATIVE mg/dL    Blood, Urine 1.0 (3+) (A) NEGATIVE    Ketones, Urine 5 (TRACE) (A) NEGATIVE mg/dL    Bilirubin, Urine NEGATIVE NEGATIVE    Urobilinogen, Urine 8 (3+) (N) NORM mg/dL    Nitrite, Urine 2+ (A) NEGATIVE    Leukocyte Esterase, Urine 25 Lorin/µL (A) NEGATIVE   Microscopic Only, Urine   Result Value Ref Range    WBC, Urine 1-5 1-5, NONE /HPF    RBC, Urine >20 (A) NONE, 1-2, 3-5 /HPF    Bacteria, Urine 3+ (A) NONE SEEN /HPF         ED Course & MDM   Diagnoses as of 10/30/23 2241   Cystitis with hematuria       Medical Decision Making  Patient presented to the ED with hematuria and dysuria after recent HoLEP procedure 6 days ago and after resuming Eliquis 3 days ago.  His hemoglobin hematocrit was stable and vital  signs were stable.  He was asymptomatic from that standpoint.  Urine does appear to be infected.  Since he was on Bactrim recently, we will switch antibiotics to nitrofurantoin for an additional 5 days.  Urine culture pending.  Instructed patient to hold his Eliquis during this time of hematuria.  He will follow-up with his urologist outpatient this week.             Jessica Figueroa MD  10/30/23 2229

## 2023-11-01 LAB — BACTERIA UR CULT: NO GROWTH

## 2023-11-09 ENCOUNTER — OFFICE VISIT (OUTPATIENT)
Dept: UROLOGY | Facility: HOSPITAL | Age: 78
End: 2023-11-09
Payer: MEDICARE

## 2023-11-09 DIAGNOSIS — N40.1 BPH WITH URINARY OBSTRUCTION: ICD-10-CM

## 2023-11-09 DIAGNOSIS — R39.9 LOWER URINARY TRACT SYMPTOMS (LUTS): Primary | ICD-10-CM

## 2023-11-09 DIAGNOSIS — N13.8 BPH WITH URINARY OBSTRUCTION: ICD-10-CM

## 2023-11-09 PROCEDURE — 1036F TOBACCO NON-USER: CPT | Performed by: UROLOGY

## 2023-11-09 PROCEDURE — 99024 POSTOP FOLLOW-UP VISIT: CPT | Performed by: UROLOGY

## 2023-11-09 PROCEDURE — 1126F AMNT PAIN NOTED NONE PRSNT: CPT | Performed by: UROLOGY

## 2023-11-09 PROCEDURE — 1159F MED LIST DOCD IN RCRD: CPT | Performed by: UROLOGY

## 2023-11-09 PROCEDURE — 3078F DIAST BP <80 MM HG: CPT | Performed by: UROLOGY

## 2023-11-09 PROCEDURE — 3075F SYST BP GE 130 - 139MM HG: CPT | Performed by: UROLOGY

## 2023-11-09 NOTE — PROGRESS NOTES
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     2 prior bx, neg. About 3 years ago had an episode of retention, resolved, but then had a brief period of SIC. Now with freq, urg, slow stream, dysuria. No hematuria, no documented UTIs. On elliquis. On flomax, daily tadalafil for LUTS, on sildenafil prn for erections, works reasonably well with meds. Would like something done surgically after golf season in late september.     6/7/23- Patient has weak stream at times, urgency and frequency. He is on Eliquis and Aspirin medications. Cannot stop aspirin per his report. PVR today 35cc         10/24/23 - outpatient HoLEP 104g benign        10/26/23 - seen today for tov. 300cc in, 300cc out. No issues since surgery         11/09/2023 - seen today for PVR. Stream is like a firehose. No leakage. Frequency and a bit of dysuria, slowly getting better.    Current Medications:  Current Outpatient Medications   Medication Sig Dispense Refill    alendronate (Fosamax) 70 mg tablet Take 1 tablet (70 mg) by mouth every 7 days. Take in the morning with a full glass of water, on an empty stomach, and do not take anything else by mouth or lie down for the next 30 min.      amLODIPine (Norvasc) 5 mg tablet Take 1 tablet (5 mg) by mouth once daily.      armodafinil (Nuvigil) 250 mg tablet Take 1 tablet (250 mg) by mouth once daily as needed (sleep apnea).      aspirin 81 mg EC tablet Take 1 tablet (81 mg) by mouth once daily.      aspirin-calcium carbonate 81 mg-300 mg calcium(777 mg) tablet Take 1 tablet by mouth once daily.      atorvastatin (Lipitor) 20 mg tablet Take 1 tablet (20 mg) by mouth once daily.      azelastine (Astelin) 137 mcg (0.1 %) nasal spray Administer 1 spray into each nostril 2 times a day. Use in each nostril as directed      celecoxib (CeleBREX) 200 mg capsule Take by mouth.      digoxin (Lanoxin) 125 MCG tablet Take 1  tablet (125 mcg) by mouth once daily.      docusate sodium (Colace) 100 mg tablet Take 2 tablets (200 mg) by mouth once daily.      ergocalciferol (Vitamin D-2) 1.25 MG (42010 UT) capsule Take 1 capsule (1,250 mcg) by mouth 1 (one) time per week.      esomeprazole (NexIUM) 40 mg DR capsule 1 po 1-2 times daily, before breakfast and dinner, as directed 60 capsule 11    ipratropium (Atrovent) 42 mcg (0.06 %) nasal spray Administer into affected nostril(s). USE 2 SPRAYS IN EACH NOSTRIL 2 TO 3 TIMES DAILY.      losartan (Cozaar) 50 mg tablet Take 1 tablet (50 mg) by mouth once daily.      magnesium, amino acid chelate, 133 mg tablet Take 1 tablet (133 mg) by mouth 2 times a day.      modafinil (ProvigiL) 200 mg tablet Take 1 tablet (200 mg) by mouth 2 times a day.      niacin (Niaspan Extended-Release) 500 mg ER tablet Take 1 tablet (500 mg) by mouth once daily.      oxyCODONE (Roxicodone) 5 mg immediate release tablet Take 1 tablet (5 mg) by mouth every 6 hours if needed for severe pain (7 - 10) for up to 4 doses. 4 tablet 0    sildenafil (Viagra) 100 mg tablet Take by mouth. TAKE 1 TABLET DAILY 1 HOUR BEFORE NEEDED      tadalafil (Cialis) 5 mg tablet Take 1 tablet (5 mg) by mouth once daily.      ubidecarenone (coenzyme Q10) 100 mg tablet Take 1 tablet by mouth once daily.       No current facility-administered medications for this visit.        Active Problems:  Kumar Malave is a 78 y.o. male with the following Problems and Medications.  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 Take in the morning with a full glass of water, on an empty stomach, and do not take anything else by mouth or lie down for the next 30 min.      amLODIPine (Norvasc) 5 mg tablet Take 1 tablet (5 mg) by mouth once daily.      armodafinil (Nuvigil) 250 mg tablet Take 1 tablet (250 mg) by mouth once daily as needed (sleep apnea).      aspirin 81 mg EC tablet Take 1 tablet (81 mg) by mouth once daily.      aspirin-calcium carbonate 81 mg-300 mg calcium(777 mg) tablet Take 1 tablet by mouth once daily.      atorvastatin (Lipitor) 20 mg tablet Take 1 tablet (20 mg) by mouth once daily.      azelastine (Astelin) 137 mcg (0.1 %) nasal spray Administer 1 spray into each nostril 2 times a day. Use in each nostril as directed      celecoxib (CeleBREX) 200 mg capsule Take by mouth.      digoxin (Lanoxin) 125 MCG tablet Take 1 tablet (125 mcg) by mouth once daily.      docusate sodium (Colace) 100 mg tablet Take 2 tablets (200 mg) by mouth once daily.      ergocalciferol (Vitamin D-2) 1.25 MG (03148 UT) capsule Take 1 capsule (1,250 mcg) by mouth 1 (one) time per week.      esomeprazole (NexIUM) 40 mg DR capsule 1 po 1-2 times daily, before breakfast and dinner, as directed 60 capsule 11    ipratropium (Atrovent) 42 mcg (0.06 %) nasal spray Administer into affected nostril(s). USE 2 SPRAYS IN EACH NOSTRIL 2 TO 3 TIMES DAILY.      losartan (Cozaar) 50 mg tablet Take 1 tablet (50 mg) by mouth once daily.      magnesium, amino acid chelate, 133 mg tablet Take 1 tablet (133 mg) by mouth 2 times a day.      modafinil (ProvigiL) 200 mg tablet Take 1 tablet (200 mg) by mouth 2 times a day.      niacin (Niaspan Extended-Release) 500 mg ER tablet Take 1 tablet (500 mg) by  mouth once daily.      oxyCODONE (Roxicodone) 5 mg immediate release tablet Take 1 tablet (5 mg) by mouth every 6 hours if needed for severe pain (7 - 10) for up to 4 doses. 4 tablet 0    sildenafil (Viagra) 100 mg tablet Take by mouth. TAKE 1 TABLET DAILY 1 HOUR BEFORE NEEDED      tadalafil (Cialis) 5 mg tablet Take 1 tablet (5 mg) by mouth once daily.      ubidecarenone (coenzyme Q10) 100 mg tablet Take 1 tablet by mouth once daily.       No current facility-administered medications for this visit.       PMH:  Past Medical History:   Diagnosis Date    Cellulitis of left lower limb 06/21/2017    Cellulitis of left leg without foot    Clotting disorder (CMS/HCC)     Prothrombin gene mutation    Contusion of left hip, initial encounter 06/21/2017    Hematoma of hip, left, initial encounter    Coronary artery disease     Elevated prostate specific antigen (PSA) 07/11/2017    PSA elevation    GERD (gastroesophageal reflux disease) 08/29/2023    Hypertension     Nontoxic single thyroid nodule 11/07/2017    Thyroid nodule    Olecranon bursitis, left elbow 08/01/2016    Olecranon bursitis of left elbow    Other amnesia 04/01/2014    Memory change    Other disorders of pituitary gland (CMS/HCC) 07/11/2017    Empty sella syndrome    Other fatigue 11/07/2017    Fatigue    Other forms of dyspnea 10/27/2015    Exertional dyspnea    Other specified disorders of bone density and structure, unspecified site 04/01/2014    Osteopenia    Pain in leg, unspecified 11/06/2014    Leg pain    Paresthesia of skin 03/14/2017    Tingling sensation    Personal history of malignant neoplasm of other organs and systems     History of squamous cell carcinoma    Personal history of pulmonary embolism 08/03/2018    History of pulmonary embolism    Prothrombin gene mutation (CMS/HCC) 03/14/2017    Prothrombin [id number] mutation    Pulmonary embolism (CMS/HCC)     Radiculopathy, lumbosacral region 03/14/2017    Lumbosacral radiculopathy at L5     Syncope and collapse 08/16/2016    Syncope, unspecified syncope type    Unspecified atrial flutter (CMS/HCC) 03/14/2017    Atrial flutter with rapid ventricular response    Unspecified disorder of vestibular function, unspecified ear 04/01/2014    Vestibular dizziness    Venous insufficiency (chronic) (peripheral) 06/21/2017    Venous insufficiency (chronic) (peripheral)       PSH:  Past Surgical History:   Procedure Laterality Date    MR HEAD ANGIO WO IV CONTRAST  4/9/2014    MR HEAD ANGIO WO IV CONTRAST 4/9/2014 [facility] CLINICAL LEGACY    MR NECK ANGIO WO IV CONTRAST  4/9/2014    MR NECK ANGIO WO IV CONTRAST 4/9/2014 [facility] CLINICAL LEGAC[facility]    OTHER SURGICAL HISTORY  04/01/2014    Previous Stent Placement       FMH:  Family History   Problem Relation Name Age of Onset    Coronary artery disease Father      Other (family health problem) Other         SHx:  Social History     Tobacco Use    Smoking status: Never    Smokeless tobacco: Never   Vaping Use    Vaping Use: Never used   Substance Use Topics    Alcohol use: Yes     Alcohol/week: 1.0 standard drink of alcohol     Types: 1 Cans of beer per week    Drug use: Never       Allergies:  No Known Allergies    Assesment/Plan    About 2 weeks out, doing very well. Strong stream, emptying well, no leakage, irritative symptoms improving.    Discussed benign path.    Will see in 3 mo with flow.pvr, sooner prn    Scribe Attestation  By signing my name below, I, Leonor Nam , Scribe   attest that this documentation has been prepared under the direction and in the presence of Marcelino Crook MD.

## 2023-11-10 DIAGNOSIS — Z12.11 COLON CANCER SCREENING: ICD-10-CM

## 2023-11-10 RX ORDER — POLYETHYLENE GLYCOL 3350, SODIUM SULFATE ANHYDROUS, SODIUM BICARBONATE, SODIUM CHLORIDE, POTASSIUM CHLORIDE 236; 22.74; 6.74; 5.86; 2.97 G/4L; G/4L; G/4L; G/4L; G/4L
4000 POWDER, FOR SOLUTION ORAL ONCE
Qty: 4000 ML | Refills: 0 | Status: SHIPPED | OUTPATIENT
Start: 2023-11-10 | End: 2023-11-10

## 2023-11-13 ENCOUNTER — OFFICE VISIT (OUTPATIENT)
Dept: GASTROENTEROLOGY | Facility: EXTERNAL LOCATION | Age: 78
End: 2023-11-13
Payer: MEDICARE

## 2023-11-13 DIAGNOSIS — Z12.11 SPECIAL SCREENING FOR MALIGNANT NEOPLASMS, COLON: ICD-10-CM

## 2023-11-13 DIAGNOSIS — R10.13 ABDOMINAL PAIN, EPIGASTRIC: ICD-10-CM

## 2023-11-13 DIAGNOSIS — K31.7 GASTRIC POLYP: Primary | ICD-10-CM

## 2023-11-13 DIAGNOSIS — Z86.010 PERSONAL HISTORY OF COLONIC POLYPS: ICD-10-CM

## 2023-11-13 DIAGNOSIS — K21.9 GASTRO-ESOPHAGEAL REFLUX DISEASE WITHOUT ESOPHAGITIS: ICD-10-CM

## 2023-11-13 PROCEDURE — 88305 TISSUE EXAM BY PATHOLOGIST: CPT

## 2023-11-13 PROCEDURE — 1126F AMNT PAIN NOTED NONE PRSNT: CPT | Performed by: INTERNAL MEDICINE

## 2023-11-13 PROCEDURE — 1036F TOBACCO NON-USER: CPT | Performed by: INTERNAL MEDICINE

## 2023-11-13 PROCEDURE — 3078F DIAST BP <80 MM HG: CPT | Performed by: INTERNAL MEDICINE

## 2023-11-13 PROCEDURE — 43239 EGD BIOPSY SINGLE/MULTIPLE: CPT | Performed by: INTERNAL MEDICINE

## 2023-11-13 PROCEDURE — 88305 TISSUE EXAM BY PATHOLOGIST: CPT | Performed by: PATHOLOGY

## 2023-11-13 PROCEDURE — 3075F SYST BP GE 130 - 139MM HG: CPT | Performed by: INTERNAL MEDICINE

## 2023-11-13 PROCEDURE — G0105 COLORECTAL SCRN; HI RISK IND: HCPCS | Performed by: INTERNAL MEDICINE

## 2023-11-13 PROCEDURE — 1159F MED LIST DOCD IN RCRD: CPT | Performed by: INTERNAL MEDICINE

## 2023-11-14 ENCOUNTER — LAB REQUISITION (OUTPATIENT)
Dept: LAB | Facility: HOSPITAL | Age: 78
End: 2023-11-14
Payer: MEDICARE

## 2024-02-08 ENCOUNTER — APPOINTMENT (OUTPATIENT)
Dept: UROLOGY | Facility: HOSPITAL | Age: 79
End: 2024-02-08
Payer: MEDICARE

## 2024-03-12 NOTE — PROGRESS NOTES
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     2 prior bx, neg. About 3 years ago had an episode of retention, resolved, but then had a brief period of SIC. Now with freq, urg, slow stream, dysuria. No hematuria, no documented UTIs. On elliquis. On flomax, daily tadalafil for LUTS, on sildenafil prn for erections, works reasonably well with meds. Would like something done surgically after golf season in late september.     6/7/23- Patient has weak stream at times, urgency and frequency. He is on Eliquis and Aspirin medications. Cannot stop aspirin per his report. PVR today 35cc         10/24/23 - outpatient HoLEP 104g benign        10/26/23 - seen today for tov. 300cc in, 300cc out. No issues since surgery        11/9/23 - seen today for PVR. Stream is like a firehose. No leakage. Frequency and a bit of dysuria, slowly getting better.         3/13/24 - seen today for 3mo PVR. PVR 6ml.  Thrilled with his result. Profoundly strong stream. No bothersome freq or urgency, no leakage. On combination therapy for ED, needs for cialis 5mg prn. Working well.    Current Medications:  Current Outpatient Medications   Medication Sig Dispense Refill    alendronate (Fosamax) 70 mg tablet Take 1 tablet (70 mg) by mouth every 7 days. Take in the morning with a full glass of water, on an empty stomach, and do not take anything else by mouth or lie down for the next 30 min.      amLODIPine (Norvasc) 5 mg tablet Take 1 tablet (5 mg) by mouth once daily.      armodafinil (Nuvigil) 250 mg tablet Take 1 tablet (250 mg) by mouth once daily as needed (sleep apnea).      aspirin 81 mg EC tablet Take 1 tablet (81 mg) by mouth once daily.      aspirin-calcium carbonate 81 mg-300 mg calcium(777 mg) tablet Take 1 tablet by mouth once daily.      atorvastatin (Lipitor) 20 mg tablet Take 1 tablet (20 mg) by mouth once daily.      azelastine (Astelin) 137  mcg (0.1 %) nasal spray Administer 1 spray into each nostril 2 times a day. Use in each nostril as directed      celecoxib (CeleBREX) 200 mg capsule Take by mouth.      digoxin (Lanoxin) 125 MCG tablet Take 1 tablet (125 mcg) by mouth once daily.      docusate sodium (Colace) 100 mg tablet Take 2 tablets (200 mg) by mouth once daily.      ergocalciferol (Vitamin D-2) 1.25 MG (06759 UT) capsule Take 1 capsule (1,250 mcg) by mouth 1 (one) time per week.      esomeprazole (NexIUM) 40 mg DR capsule 1 po 1-2 times daily, before breakfast and dinner, as directed 60 capsule 11    ipratropium (Atrovent) 42 mcg (0.06 %) nasal spray Administer into affected nostril(s). USE 2 SPRAYS IN EACH NOSTRIL 2 TO 3 TIMES DAILY.      losartan (Cozaar) 50 mg tablet Take 1 tablet (50 mg) by mouth once daily.      magnesium, amino acid chelate, 133 mg tablet Take 1 tablet (133 mg) by mouth 2 times a day.      modafinil (ProvigiL) 200 mg tablet Take 1 tablet (200 mg) by mouth 2 times a day.      niacin (Niaspan Extended-Release) 500 mg ER tablet Take 1 tablet (500 mg) by mouth once daily.      oxyCODONE (Roxicodone) 5 mg immediate release tablet Take 1 tablet (5 mg) by mouth every 6 hours if needed for severe pain (7 - 10) for up to 4 doses. 4 tablet 0    sildenafil (Viagra) 100 mg tablet Take by mouth. TAKE 1 TABLET DAILY 1 HOUR BEFORE NEEDED      tadalafil (Cialis) 5 mg tablet Take 1 tablet (5 mg) by mouth once daily.      ubidecarenone (coenzyme Q10) 100 mg tablet Take 1 tablet by mouth once daily.       No current facility-administered medications for this visit.        Active Problems:  Kumar Malave is a 78 y.o. male with the following Problems and Medications.  Patient Active Problem List   Diagnosis    Allergic rhinitis    Arteriosclerotic heart disease    Atrial fibrillation (CMS/HCC)    Empty sella syndrome (CMS/HCC)    Eructation    Exertional dyspnea    GERD (gastroesophageal reflux disease)    Hyperlipidemia    Hypertension     Lumbosacral radiculopathy at L5    Memory change    Olecranon bursitis of left elbow    GHANSHYAM on CPAP    Osteopenia    Prothrombin [id number] mutation (CMS/HCC)    PSA elevation    Thyroid nodule    Venous insufficiency (chronic) (peripheral)    Pulmonary embolus (CMS/HCC)    MI (myocardial infarction) (CMS/HCC)    Anemia     Current Outpatient Medications   Medication Sig Dispense Refill    alendronate (Fosamax) 70 mg tablet Take 1 tablet (70 mg) by mouth every 7 days. Take in the morning with a full glass of water, on an empty stomach, and do not take anything else by mouth or lie down for the next 30 min.      amLODIPine (Norvasc) 5 mg tablet Take 1 tablet (5 mg) by mouth once daily.      armodafinil (Nuvigil) 250 mg tablet Take 1 tablet (250 mg) by mouth once daily as needed (sleep apnea).      aspirin 81 mg EC tablet Take 1 tablet (81 mg) by mouth once daily.      aspirin-calcium carbonate 81 mg-300 mg calcium(777 mg) tablet Take 1 tablet by mouth once daily.      atorvastatin (Lipitor) 20 mg tablet Take 1 tablet (20 mg) by mouth once daily.      azelastine (Astelin) 137 mcg (0.1 %) nasal spray Administer 1 spray into each nostril 2 times a day. Use in each nostril as directed      celecoxib (CeleBREX) 200 mg capsule Take by mouth.      digoxin (Lanoxin) 125 MCG tablet Take 1 tablet (125 mcg) by mouth once daily.      docusate sodium (Colace) 100 mg tablet Take 2 tablets (200 mg) by mouth once daily.      ergocalciferol (Vitamin D-2) 1.25 MG (43396 UT) capsule Take 1 capsule (1,250 mcg) by mouth 1 (one) time per week.      esomeprazole (NexIUM) 40 mg DR capsule 1 po 1-2 times daily, before breakfast and dinner, as directed 60 capsule 11    ipratropium (Atrovent) 42 mcg (0.06 %) nasal spray Administer into affected nostril(s). USE 2 SPRAYS IN EACH NOSTRIL 2 TO 3 TIMES DAILY.      losartan (Cozaar) 50 mg tablet Take 1 tablet (50 mg) by mouth once daily.      magnesium, amino acid chelate, 133 mg tablet Take 1 tablet  (133 mg) by mouth 2 times a day.      modafinil (ProvigiL) 200 mg tablet Take 1 tablet (200 mg) by mouth 2 times a day.      niacin (Niaspan Extended-Release) 500 mg ER tablet Take 1 tablet (500 mg) by mouth once daily.      oxyCODONE (Roxicodone) 5 mg immediate release tablet Take 1 tablet (5 mg) by mouth every 6 hours if needed for severe pain (7 - 10) for up to 4 doses. 4 tablet 0    sildenafil (Viagra) 100 mg tablet Take by mouth. TAKE 1 TABLET DAILY 1 HOUR BEFORE NEEDED      tadalafil (Cialis) 5 mg tablet Take 1 tablet (5 mg) by mouth once daily.      ubidecarenone (coenzyme Q10) 100 mg tablet Take 1 tablet by mouth once daily.       No current facility-administered medications for this visit.       PMH:  Past Medical History:   Diagnosis Date    Cellulitis of left lower limb 06/21/2017    Cellulitis of left leg without foot    Clotting disorder (CMS/HCC)     Prothrombin gene mutation    Contusion of left hip, initial encounter 06/21/2017    Hematoma of hip, left, initial encounter    Coronary artery disease     Elevated prostate specific antigen (PSA) 07/11/2017    PSA elevation    GERD (gastroesophageal reflux disease) 08/29/2023    Hypertension     Nontoxic single thyroid nodule 11/07/2017    Thyroid nodule    Olecranon bursitis, left elbow 08/01/2016    Olecranon bursitis of left elbow    Other amnesia 04/01/2014    Memory change    Other disorders of pituitary gland (CMS/HCC) 07/11/2017    Empty sella syndrome    Other fatigue 11/07/2017    Fatigue    Other forms of dyspnea 10/27/2015    Exertional dyspnea    Other specified disorders of bone density and structure, unspecified site 04/01/2014    Osteopenia    Pain in leg, unspecified 11/06/2014    Leg pain    Paresthesia of skin 03/14/2017    Tingling sensation    Personal history of malignant neoplasm of other organs and systems     History of squamous cell carcinoma    Personal history of pulmonary embolism 08/03/2018    History of pulmonary embolism     Prothrombin gene mutation (CMS/HCC) 03/14/2017    Prothrombin [id number] mutation    Pulmonary embolism (CMS/[facility])     Radiculopathy, lumbosacral region 03/14/2017    Lumbosacral radiculopathy at L5    Syncope and collapse 08/16/2016    Syncope, unspecified syncope type    Unspecified atrial flutter (CMS/HCC) 03/14/2017    Atrial flutter with rapid ventricular response    Unspecified disorder of vestibular function, unspecified ear 04/01/2014    Vestibular dizziness    Venous insufficiency (chronic) (peripheral) 06/21/2017    Venous insufficiency (chronic) (peripheral)       PSH:  Past Surgical History:   Procedure Laterality Date    MR HEAD ANGIO WO IV CONTRAST  4/9/2014    MR HEAD ANGIO WO IV CONTRAST 4/9/2014 [facility] CL[facility]ICAL LEGAC[facility]    MR NECK ANGIO WO IV CONTRAST  4/9/2014    MR NECK ANGIO WO IV CONTRAST 4/9/2014 [facility] CLINICAL LEGAC[facility]    OTHER SURGICAL HISTORY  04/01/2014    Previous Stent Placement       FMH:  Family History   Problem Relation Name Age of Onset    Coronary artery disease Father      Other (family health problem) Other         SHx:  Social History     Tobacco Use    Smoking status: Never    Smokeless tobacco: Never   Vaping Use    Vaping Use: Never used   Substance Use Topics    Alcohol use: Yes     Alcohol/week: 1.0 standard drink of alcohol     Types: 1 Cans of beer per week    Drug use: Never       Allergies:  No Known Allergies    Assesment/Plan  Excellent functional outcome now 3 mo s/p holep.    Refilled cialis 5mg daily prn. Will continue with combination therapy.    Fu 1 year with PVR, sooner prn    Scribe Attestation  By signing my name below, I, Leonor Nam, Scribe, attest that this documentation  has been prepared under the direction and in the presence of Marcelino Crook MD.

## 2024-03-13 ENCOUNTER — OFFICE VISIT (OUTPATIENT)
Dept: UROLOGY | Facility: HOSPITAL | Age: 79
End: 2024-03-13
Payer: MEDICARE

## 2024-03-13 DIAGNOSIS — N40.0 BENIGN PROSTATIC HYPERPLASIA WITHOUT LOWER URINARY TRACT SYMPTOMS: Primary | ICD-10-CM

## 2024-03-13 DIAGNOSIS — N52.9 ERECTILE DYSFUNCTION, UNSPECIFIED ERECTILE DYSFUNCTION TYPE: ICD-10-CM

## 2024-03-13 PROCEDURE — 1036F TOBACCO NON-USER: CPT | Performed by: UROLOGY

## 2024-03-13 PROCEDURE — 99214 OFFICE O/P EST MOD 30 MIN: CPT | Performed by: UROLOGY

## 2024-03-13 PROCEDURE — 1126F AMNT PAIN NOTED NONE PRSNT: CPT | Performed by: UROLOGY

## 2024-03-13 RX ORDER — TADALAFIL 5 MG/1
5 TABLET ORAL DAILY PRN
Qty: 30 TABLET | Refills: 3 | Status: SHIPPED | OUTPATIENT
Start: 2024-03-13

## 2024-07-03 ENCOUNTER — HOSPITAL ENCOUNTER (OUTPATIENT)
Facility: HOSPITAL | Age: 79
Setting detail: OBSERVATION
Discharge: HOME | End: 2024-07-04
Attending: EMERGENCY MEDICINE | Admitting: STUDENT IN AN ORGANIZED HEALTH CARE EDUCATION/TRAINING PROGRAM
Payer: MEDICARE

## 2024-07-03 ENCOUNTER — CLINICAL SUPPORT (OUTPATIENT)
Dept: EMERGENCY MEDICINE | Facility: HOSPITAL | Age: 79
End: 2024-07-03
Payer: MEDICARE

## 2024-07-03 ENCOUNTER — APPOINTMENT (OUTPATIENT)
Dept: RADIOLOGY | Facility: HOSPITAL | Age: 79
End: 2024-07-03
Payer: MEDICARE

## 2024-07-03 DIAGNOSIS — R07.9 CHEST PAIN, UNSPECIFIED TYPE: Primary | ICD-10-CM

## 2024-07-03 DIAGNOSIS — J18.9 PNEUMONIA OF BOTH LOWER LOBES DUE TO INFECTIOUS ORGANISM: ICD-10-CM

## 2024-07-03 LAB
ALBUMIN SERPL BCP-MCNC: 4 G/DL (ref 3.4–5)
ALP SERPL-CCNC: 63 U/L (ref 33–136)
ALT SERPL W P-5'-P-CCNC: 14 U/L (ref 10–52)
ANION GAP BLDV CALCULATED.4IONS-SCNC: ABNORMAL MMOL/L
ANION GAP SERPL CALC-SCNC: 13 MMOL/L (ref 10–20)
AST SERPL W P-5'-P-CCNC: 18 U/L (ref 9–39)
BASE EXCESS BLDV CALC-SCNC: 0.1 MMOL/L (ref -2–3)
BASOPHILS # BLD AUTO: 0.02 X10*3/UL (ref 0–0.1)
BASOPHILS NFR BLD AUTO: 0.2 %
BILIRUB SERPL-MCNC: 0.6 MG/DL (ref 0–1.2)
BNP SERPL-MCNC: 35 PG/ML (ref 0–99)
BODY TEMPERATURE: 37 DEGREES CELSIUS
BUN SERPL-MCNC: 26 MG/DL (ref 6–23)
CA-I BLDV-SCNC: 1.16 MMOL/L (ref 1.1–1.33)
CALCIUM SERPL-MCNC: 8.8 MG/DL (ref 8.6–10.6)
CARDIAC TROPONIN I PNL SERPL HS: 4 NG/L (ref 0–53)
CARDIAC TROPONIN I PNL SERPL HS: 4 NG/L (ref 0–53)
CHLORIDE BLDV-SCNC: 105 MMOL/L (ref 98–107)
CHLORIDE SERPL-SCNC: 106 MMOL/L (ref 98–107)
CO2 SERPL-SCNC: 26 MMOL/L (ref 21–32)
CREAT SERPL-MCNC: 1.18 MG/DL (ref 0.5–1.3)
EGFRCR SERPLBLD CKD-EPI 2021: 64 ML/MIN/1.73M*2
EOSINOPHIL # BLD AUTO: 0.01 X10*3/UL (ref 0–0.4)
EOSINOPHIL NFR BLD AUTO: 0.1 %
ERYTHROCYTE [DISTWIDTH] IN BLOOD BY AUTOMATED COUNT: 15.2 % (ref 11.5–14.5)
GLUCOSE BLDV-MCNC: 97 MG/DL (ref 74–99)
GLUCOSE SERPL-MCNC: 110 MG/DL (ref 74–99)
HCO3 BLDV-SCNC: 24.7 MMOL/L (ref 22–26)
HCT VFR BLD AUTO: 37.3 % (ref 41–52)
HCT VFR BLD EST: 38 % (ref 41–52)
HGB BLD-MCNC: 12.5 G/DL (ref 13.5–17.5)
HGB BLDV-MCNC: 12.8 G/DL (ref 13.5–17.5)
IMM GRANULOCYTES # BLD AUTO: 0.02 X10*3/UL (ref 0–0.5)
IMM GRANULOCYTES NFR BLD AUTO: 0.2 % (ref 0–0.9)
INHALED O2 CONCENTRATION: 21 %
LACTATE BLDV-SCNC: 1.6 MMOL/L (ref 0.4–2)
LYMPHOCYTES # BLD AUTO: 0.28 X10*3/UL (ref 0.8–3)
LYMPHOCYTES NFR BLD AUTO: 3.3 %
MCH RBC QN AUTO: 27.8 PG (ref 26–34)
MCHC RBC AUTO-ENTMCNC: 33.5 G/DL (ref 32–36)
MCV RBC AUTO: 83 FL (ref 80–100)
MONOCYTES # BLD AUTO: 0.03 X10*3/UL (ref 0.05–0.8)
MONOCYTES NFR BLD AUTO: 0.4 %
NEUTROPHILS # BLD AUTO: 8.21 X10*3/UL (ref 1.6–5.5)
NEUTROPHILS NFR BLD AUTO: 95.8 %
NRBC BLD-RTO: 0 /100 WBCS (ref 0–0)
OXYHGB MFR BLDV: 38.4 % (ref 45–75)
PCO2 BLDV: 39 MM HG (ref 41–51)
PH BLDV: 7.41 PH (ref 7.33–7.43)
PLATELET # BLD AUTO: 215 X10*3/UL (ref 150–450)
PO2 BLDV: 23 MM HG (ref 35–45)
POTASSIUM BLDV-SCNC: ABNORMAL MMOL/L
POTASSIUM SERPL-SCNC: 4.1 MMOL/L (ref 3.5–5.3)
PROT SERPL-MCNC: 6.6 G/DL (ref 6.4–8.2)
RBC # BLD AUTO: 4.5 X10*6/UL (ref 4.5–5.9)
SAO2 % BLDV: 39 % (ref 45–75)
SODIUM BLDV-SCNC: 138 MMOL/L (ref 136–145)
SODIUM SERPL-SCNC: 141 MMOL/L (ref 136–145)
WBC # BLD AUTO: 8.6 X10*3/UL (ref 4.4–11.3)

## 2024-07-03 PROCEDURE — 87635 SARS-COV-2 COVID-19 AMP PRB: CPT

## 2024-07-03 PROCEDURE — 71045 X-RAY EXAM CHEST 1 VIEW: CPT | Mod: FOREIGN READ | Performed by: RADIOLOGY

## 2024-07-03 PROCEDURE — 93010 ELECTROCARDIOGRAM REPORT: CPT | Performed by: EMERGENCY MEDICINE

## 2024-07-03 PROCEDURE — 93005 ELECTROCARDIOGRAM TRACING: CPT

## 2024-07-03 PROCEDURE — 84484 ASSAY OF TROPONIN QUANT: CPT | Performed by: EMERGENCY MEDICINE

## 2024-07-03 PROCEDURE — 71045 X-RAY EXAM CHEST 1 VIEW: CPT

## 2024-07-03 PROCEDURE — 36415 COLL VENOUS BLD VENIPUNCTURE: CPT | Performed by: EMERGENCY MEDICINE

## 2024-07-03 PROCEDURE — 99285 EMERGENCY DEPT VISIT HI MDM: CPT

## 2024-07-03 PROCEDURE — 85025 COMPLETE CBC W/AUTO DIFF WBC: CPT | Performed by: EMERGENCY MEDICINE

## 2024-07-03 PROCEDURE — 84132 ASSAY OF SERUM POTASSIUM: CPT | Performed by: EMERGENCY MEDICINE

## 2024-07-03 PROCEDURE — 99285 EMERGENCY DEPT VISIT HI MDM: CPT | Performed by: EMERGENCY MEDICINE

## 2024-07-03 PROCEDURE — 83880 ASSAY OF NATRIURETIC PEPTIDE: CPT | Performed by: EMERGENCY MEDICINE

## 2024-07-03 ASSESSMENT — PAIN DESCRIPTION - ORIENTATION: ORIENTATION: LEFT

## 2024-07-03 ASSESSMENT — COLUMBIA-SUICIDE SEVERITY RATING SCALE - C-SSRS
2. HAVE YOU ACTUALLY HAD ANY THOUGHTS OF KILLING YOURSELF?: NO
1. IN THE PAST MONTH, HAVE YOU WISHED YOU WERE DEAD OR WISHED YOU COULD GO TO SLEEP AND NOT WAKE UP?: NO
6. HAVE YOU EVER DONE ANYTHING, STARTED TO DO ANYTHING, OR PREPARED TO DO ANYTHING TO END YOUR LIFE?: NO

## 2024-07-03 ASSESSMENT — PAIN SCALES - GENERAL
PAINLEVEL_OUTOF10: 0 - NO PAIN
PAINLEVEL_OUTOF10: 0 - NO PAIN
PAINLEVEL_OUTOF10: 5 - MODERATE PAIN

## 2024-07-03 ASSESSMENT — PAIN - FUNCTIONAL ASSESSMENT: PAIN_FUNCTIONAL_ASSESSMENT: 0-10

## 2024-07-03 ASSESSMENT — PAIN DESCRIPTION - LOCATION: LOCATION: CHEST

## 2024-07-04 ENCOUNTER — APPOINTMENT (OUTPATIENT)
Dept: RADIOLOGY | Facility: HOSPITAL | Age: 79
End: 2024-07-04
Payer: MEDICARE

## 2024-07-04 VITALS
HEIGHT: 71 IN | WEIGHT: 193 LBS | SYSTOLIC BLOOD PRESSURE: 95 MMHG | DIASTOLIC BLOOD PRESSURE: 61 MMHG | BODY MASS INDEX: 27.02 KG/M2 | HEART RATE: 65 BPM | OXYGEN SATURATION: 96 % | TEMPERATURE: 98.5 F | RESPIRATION RATE: 15 BRPM

## 2024-07-04 PROBLEM — J84.89 ORGANIZING PNEUMONIA (MULTI): Status: ACTIVE | Noted: 2024-07-04

## 2024-07-04 PROBLEM — J18.9 PNEUMONIA OF BOTH LOWER LOBES DUE TO INFECTIOUS ORGANISM: Status: ACTIVE | Noted: 2024-07-04

## 2024-07-04 PROBLEM — Z79.01 CHRONIC ANTICOAGULATION: Status: ACTIVE | Noted: 2024-07-04

## 2024-07-04 PROBLEM — Z86.79 HISTORY OF ATRIAL FIBRILLATION: Status: ACTIVE | Noted: 2024-07-04

## 2024-07-04 LAB
ATRIAL RATE: 87 BPM
D DIMER PPP FEU-MCNC: 880 NG/ML FEU
FLUAV RNA RESP QL NAA+PROBE: NOT DETECTED
FLUBV RNA RESP QL NAA+PROBE: NOT DETECTED
P AXIS: 72 DEGREES
P OFFSET: 171 MS
P ONSET: 121 MS
PR INTERVAL: 204 MS
PROCALCITONIN SERPL-MCNC: 8.21 NG/ML
Q ONSET: 223 MS
QRS COUNT: 14 BEATS
QRS DURATION: 82 MS
QT INTERVAL: 330 MS
QTC CALCULATION(BAZETT): 397 MS
QTC FREDERICIA: 373 MS
R AXIS: 6 DEGREES
SARS-COV-2 RNA RESP QL NAA+PROBE: NOT DETECTED
T AXIS: 35 DEGREES
T OFFSET: 388 MS
VENTRICULAR RATE: 87 BPM

## 2024-07-04 PROCEDURE — 96367 TX/PROPH/DG ADDL SEQ IV INF: CPT

## 2024-07-04 PROCEDURE — 85379 FIBRIN DEGRADATION QUANT: CPT

## 2024-07-04 PROCEDURE — 84145 PROCALCITONIN (PCT): CPT

## 2024-07-04 PROCEDURE — 71275 CT ANGIOGRAPHY CHEST: CPT

## 2024-07-04 PROCEDURE — G0378 HOSPITAL OBSERVATION PER HR: HCPCS

## 2024-07-04 PROCEDURE — 2500000001 HC RX 250 WO HCPCS SELF ADMINISTERED DRUGS (ALT 637 FOR MEDICARE OP)

## 2024-07-04 PROCEDURE — 71275 CT ANGIOGRAPHY CHEST: CPT | Performed by: SURGERY

## 2024-07-04 PROCEDURE — 2550000001 HC RX 255 CONTRASTS: Performed by: EMERGENCY MEDICINE

## 2024-07-04 PROCEDURE — 96365 THER/PROPH/DIAG IV INF INIT: CPT | Mod: 59

## 2024-07-04 PROCEDURE — 2500000004 HC RX 250 GENERAL PHARMACY W/ HCPCS (ALT 636 FOR OP/ED)

## 2024-07-04 PROCEDURE — 87899 AGENT NOS ASSAY W/OPTIC: CPT

## 2024-07-04 PROCEDURE — 36415 COLL VENOUS BLD VENIPUNCTURE: CPT

## 2024-07-04 PROCEDURE — 87449 NOS EACH ORGANISM AG IA: CPT

## 2024-07-04 RX ORDER — ACETAMINOPHEN 160 MG/5ML
650 SOLUTION ORAL EVERY 4 HOURS PRN
Status: DISCONTINUED | OUTPATIENT
Start: 2024-07-04 | End: 2024-07-04 | Stop reason: HOSPADM

## 2024-07-04 RX ORDER — ATORVASTATIN CALCIUM 20 MG/1
20 TABLET, FILM COATED ORAL NIGHTLY
Status: DISCONTINUED | OUTPATIENT
Start: 2024-07-04 | End: 2024-07-04 | Stop reason: HOSPADM

## 2024-07-04 RX ORDER — SENNOSIDES 8.6 MG/1
2 TABLET ORAL 2 TIMES DAILY
Status: DISCONTINUED | OUTPATIENT
Start: 2024-07-04 | End: 2024-07-04 | Stop reason: HOSPADM

## 2024-07-04 RX ORDER — VIT A/VIT C/VIT E/ZINC/COPPER 2148-113
1 TABLET ORAL
COMMUNITY

## 2024-07-04 RX ORDER — PANTOPRAZOLE SODIUM 40 MG/1
40 TABLET, DELAYED RELEASE ORAL
Status: DISCONTINUED | OUTPATIENT
Start: 2024-07-04 | End: 2024-07-04 | Stop reason: HOSPADM

## 2024-07-04 RX ORDER — CEFTRIAXONE 1 G/50ML
1 INJECTION, SOLUTION INTRAVENOUS ONCE
Status: DISCONTINUED | OUTPATIENT
Start: 2024-07-05 | End: 2024-07-04 | Stop reason: HOSPADM

## 2024-07-04 RX ORDER — ACETAMINOPHEN 650 MG/1
650 SUPPOSITORY RECTAL EVERY 4 HOURS PRN
Status: DISCONTINUED | OUTPATIENT
Start: 2024-07-04 | End: 2024-07-04 | Stop reason: HOSPADM

## 2024-07-04 RX ORDER — NIACIN 500 MG/1
500 TABLET, EXTENDED RELEASE ORAL DAILY
Status: DISCONTINUED | OUTPATIENT
Start: 2024-07-04 | End: 2024-07-04 | Stop reason: HOSPADM

## 2024-07-04 RX ORDER — AZELASTINE 1 MG/ML
1 SPRAY, METERED NASAL 2 TIMES DAILY
Status: DISCONTINUED | OUTPATIENT
Start: 2024-07-04 | End: 2024-07-04 | Stop reason: HOSPADM

## 2024-07-04 RX ORDER — ASPIRIN 81 MG/1
81 TABLET ORAL NIGHTLY
COMMUNITY

## 2024-07-04 RX ORDER — ACETAMINOPHEN 325 MG/1
650 TABLET ORAL EVERY 4 HOURS PRN
Status: DISCONTINUED | OUTPATIENT
Start: 2024-07-04 | End: 2024-07-04 | Stop reason: HOSPADM

## 2024-07-04 RX ORDER — AZITHROMYCIN 500 MG/1
500 TABLET, FILM COATED ORAL DAILY
Qty: 5 TABLET | Refills: 0 | Status: SHIPPED | OUTPATIENT
Start: 2024-07-04

## 2024-07-04 RX ORDER — CALCIUM CARBONATE 300MG(750)
400 TABLET,CHEWABLE ORAL 2 TIMES DAILY
COMMUNITY

## 2024-07-04 RX ORDER — AMLODIPINE BESYLATE 5 MG/1
5 TABLET ORAL DAILY
Status: DISCONTINUED | OUTPATIENT
Start: 2024-07-04 | End: 2024-07-04 | Stop reason: HOSPADM

## 2024-07-04 RX ORDER — TALC
3 POWDER (GRAM) TOPICAL NIGHTLY PRN
Status: DISCONTINUED | OUTPATIENT
Start: 2024-07-04 | End: 2024-07-04 | Stop reason: HOSPADM

## 2024-07-04 RX ORDER — NAPROXEN SODIUM 220 MG/1
81 TABLET, FILM COATED ORAL ONCE
Status: COMPLETED | OUTPATIENT
Start: 2024-07-04 | End: 2024-07-04

## 2024-07-04 RX ORDER — DIGOXIN 125 MCG
125 TABLET ORAL DAILY
Status: DISCONTINUED | OUTPATIENT
Start: 2024-07-04 | End: 2024-07-04 | Stop reason: HOSPADM

## 2024-07-04 RX ORDER — NAPROXEN SODIUM 220 MG/1
TABLET, FILM COATED ORAL
Status: COMPLETED
Start: 2024-07-04 | End: 2024-07-04

## 2024-07-04 RX ORDER — LOSARTAN POTASSIUM 50 MG/1
50 TABLET ORAL DAILY
Status: DISCONTINUED | OUTPATIENT
Start: 2024-07-04 | End: 2024-07-04 | Stop reason: HOSPADM

## 2024-07-04 RX ORDER — ASPIRIN 81 MG/1
81 TABLET ORAL DAILY
Status: DISCONTINUED | OUTPATIENT
Start: 2024-07-04 | End: 2024-07-04 | Stop reason: HOSPADM

## 2024-07-04 RX ORDER — CEFTRIAXONE 1 G/50ML
1 INJECTION, SOLUTION INTRAVENOUS ONCE
Status: COMPLETED | OUTPATIENT
Start: 2024-07-04 | End: 2024-07-04

## 2024-07-04 ASSESSMENT — PAIN SCALES - GENERAL
PAINLEVEL_OUTOF10: 1
PAINLEVEL_OUTOF10: 0 - NO PAIN
PAINLEVEL_OUTOF10: 0 - NO PAIN

## 2024-07-04 NOTE — ED TRIAGE NOTES
Pt states that he has been having dull left side chest pain that radiates to his left arm for a couple of hours.

## 2024-07-04 NOTE — ED PROVIDER NOTES
HPI   Chief Complaint   Patient presents with   • Chest Pain       78-year-old male hypertension, coronary artery disease status post stenting, exercise-induced asthma, A-fib sp ablation, history of PE on Eliquis, chronic bilateral lower extremity cellulitis presents to the emergency department due to chest pain.  Patient states that he has had dull chest pain across his chest radiating to his left arm associated with sudden onset chills since earlier today.  He states prior to this he has been in his typical state of health.  He denied hemoptysis, cough, runny nose or congestion, sore throat.  Does state he also has a headache.                        Jodee Coma Scale Score: 15                     Patient History   Past Medical History:   Diagnosis Date   • Cellulitis of left lower limb 06/21/2017    Cellulitis of left leg without foot   • Clotting disorder (Multi)     Prothrombin gene mutation   • Contusion of left hip, initial encounter 06/21/2017    Hematoma of hip, left, initial encounter   • Coronary artery disease    • Elevated prostate specific antigen (PSA) 07/11/2017    PSA elevation   • GERD (gastroesophageal reflux disease) 08/29/2023   • Hypertension    • Nontoxic single thyroid nodule 11/07/2017    Thyroid nodule   • Olecranon bursitis, left elbow 08/01/2016    Olecranon bursitis of left elbow   • Other amnesia 04/01/2014    Memory change   • Other disorders of pituitary gland (Multi) 07/11/2017    Empty sella syndrome   • Other fatigue 11/07/2017    Fatigue   • Other forms of dyspnea 10/27/2015    Exertional dyspnea   • Other specified disorders of bone density and structure, unspecified site 04/01/2014    Osteopenia   • Pain in leg, unspecified 11/06/2014    Leg pain   • Paresthesia of skin 03/14/2017    Tingling sensation   • Personal history of malignant neoplasm of other organs and systems     History of squamous cell carcinoma   • Personal history of pulmonary embolism 08/03/2018    History of  pulmonary embolism   • 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   • Pulmonary embolism (Multi)    • Radiculopathy, lumbosacral region 03/14/2017    Lumbosacral radiculopathy at L5   • Syncope and collapse 08/16/2016    Syncope, unspecified syncope type   • Unspecified atrial flutter (Multi) 03/14/2017    Atrial flutter with rapid ventricular response   • Unspecified disorder of vestibular function, unspecified ear 04/01/2014    Vestibular dizziness   • Venous insufficiency (chronic) (peripheral) 06/21/2017    Venous insufficiency (chronic) (peripheral)     Past Surgical History:   Procedure Laterality Date   • MR HEAD ANGIO WO IV CONTRAST  4/9/2014    MR HEAD ANGIO WO IV CONTRAST 4/9/2014 [facility]   • MR NECK ANGIO WO IV CONTRAST  4/9/2014    MR NECK ANGIO WO IV CONTRAST 4/9/2014 [facility]   • OTHER SURGICAL HISTORY  04/01/2014    Previous Stent Placement     Family History   Problem Relation Name Age of Onset   • Coronary artery disease Father     • Other (family health problem) Other       Social History     Tobacco Use   • Smoking status: Never   • Smokeless tobacco: Never   Vaping Use   • Vaping status: Never Used   Substance Use Topics   • Alcohol use: Yes     Alcohol/week: 1.0 standard drink of alcohol     Types: 1 Cans of beer per week   • Drug use: Never       Physical Exam   ED Triage Vitals   Temperature Heart Rate Respirations BP   07/03/24 2014 07/03/24 2014 07/03/24 2014 07/03/24 2014   37.3 °C (99.1 °F) 87 16 99/60      Pulse Ox Temp Source Heart Rate Source Patient Position   07/03/24 2014 07/03/24 2014 07/03/24 2014 --   (!) 92 % Temporal Monitor       BP Location FiO2 (%)     -- 07/03/24 2007      21 %       Physical Exam  Constitutional:       Appearance: He is not ill-appearing or diaphoretic.   Eyes:      Extraocular Movements: Extraocular movements intact.      Pupils: Pupils are equal, round, and reactive to light.   Cardiovascular:       Rate and Rhythm: Normal rate and regular rhythm.      Pulses:           Radial pulses are 2+ on the right side and 2+ on the left side.        Posterior tibial pulses are 2+ on the right side and 2+ on the left side.      Heart sounds: Normal heart sounds.   Pulmonary:      Effort: Pulmonary effort is normal.      Breath sounds: Normal breath sounds. No decreased breath sounds, wheezing, rhonchi or rales.   Chest:      Chest wall: No tenderness.   Abdominal:      Palpations: Abdomen is soft.      Tenderness: There is no abdominal tenderness.   Musculoskeletal:      Right lower leg: No tenderness. No edema.      Left lower leg: No tenderness. No edema.   Skin:     General: Skin is warm and dry.      Capillary Refill: Capillary refill takes less than 2 seconds.   Neurological:      General: No focal deficit present.      Mental Status: He is alert and oriented to person, place, and time.       ED Course & MDM   Diagnoses as of 07/04/24 0530   Chest pain, unspecified type   Pneumonia of both lower lobes due to infectious organism       Medical Decision Making  78-year-old male arrives borderline hypotensive and hypoxic to 92 with borderline tachycardic heart rate for the evaluation of chest pain.  He was placed on 2 L nasal cannula with improvement in his oxygenation saturation.  He also states improvement in his chest pain with administration of oxygen.  EKG shows normal sinus rhythm, no ST or T wave abnormalities indicative of acute ischemia or electrolyte abnormalities, no interval derangements or axis deviation.  Low voltage QRS overall.  Low concern for ACS, initial and repeat troponins within normal limits, no point-of-care ultrasound findings concerning for heart failure exacerbation.  Will swab for COVID and the flu.  If negative will D-dimer with age-adjusted criteria.  CT PE negative for pulmonary embolism however the patient does have what appears to be organizing bibasilar pneumonia.  Given his ambulatory  oxygen requirement and chills will cover the patient with Rocephin and azithromycin.  Patient was admitted to the medicine service for further evaluation and management.    Patient was seen and discussed with the attending of record.        Procedure  Procedures     Abdelrahman Juares MD  Resident  07/04/24 [id number]

## 2024-07-04 NOTE — DISCHARGE SUMMARY
Discharge Diagnosis  Organizing pneumonia (Multi)    Issues Requiring Follow-Up  1) PCP:  - Post-hospital admission follow-up    2) Pulmonology  - Follow-up for resolution of CT findings of New consolidative opacities in the posterior aspects of the bilateral lower lobes and further management as needed          Test Results Pending At Discharge  Pending Labs       Order Current Status    Legionella Antigen, Urine In process    Streptococcus pneumoniae Antigen, Urine In process            Hospital Course  Kumar Malave is a 78 y.o. male hypertension, coronary artery disease status post stenting, exercise-induced asthma, A-fib s/p ablation, history of PE on Eliquis, chronic bilateral lower extremity cellulitis presenting to ED for CP, body aches, and malaise.  Patient was transiently hypoxic to 91 while ambulating in the ED for reports however was not endorsing shortness of breath.  Initial workup positive for mild anemia on, and D-dimer of 880.  CTA chest with new consolidative opacities in the posterior aspects of the bilateral lower lobes , with parenchymal volume loss suggesting atelectasis although superimposed pneumonia is also possible.  Given CT findings patient was started on ceftriaxone and azithromycin for pneumonia.  He remained vitally stable and saturating 97% on 6-minute walking test.  Decision was made to send him home on on oral azithromycin 500 mg for 5 days with pulmonary follow-up outpatient to make sure CT findings resolve after treatment.    DAY OF DISCHARGE:    On the day of discharge, the patient was seen and evaluated by the Family Medicine team and deemed suitable for discharge to home. There were no significant events overnight.  Vitals were reviewed and within normal   limits. Labs were stable at discharge.  Plan of care for the day included ensuring that the patient had good p.o. intake, was ambulating well, and was stable.    I have spent in excess of 30 minutes in regards to chart  review, treatment and medical management of this patient.       Pertinent Physical Exam At Time of Discharge  Physical Exam  Constitutional:       General: He is not in acute distress.     Appearance: He is not ill-appearing.   HENT:      Head: Normocephalic and atraumatic.      Mouth/Throat:      Mouth: Mucous membranes are moist.   Eyes:      Conjunctiva/sclera: Conjunctivae normal.   Cardiovascular:      Rate and Rhythm: Normal rate and regular rhythm.   Pulmonary:      Effort: Pulmonary effort is normal.      Breath sounds: No wheezing, rhonchi or rales.   Abdominal:      General: There is no distension.      Palpations: Abdomen is soft.      Tenderness: There is no abdominal tenderness.   Musculoskeletal:      Right lower leg: No edema.      Left lower leg: No edema.   Skin:     General: Skin is warm and dry.      Comments: Chronic erythematous rash on Rt LE calf   Neurological:      General: No focal deficit present.      Mental Status: He is alert and oriented to person, place, and time.   Psychiatric:         Mood and Affect: Mood normal.         Behavior: Behavior normal.     Home Medications     Medication List      START taking these medications     azithromycin 500 mg tablet; Commonly known as: Zithromax; Take 1 tablet   (500 mg) by mouth once daily.     CONTINUE taking these medications     amLODIPine 5 mg tablet; Commonly known as: Norvasc   armodafinil 250 mg tablet; Commonly known as: Nuvigil   aspirin 81 mg EC tablet   atorvastatin 20 mg tablet; Commonly known as: Lipitor   celecoxib 200 mg capsule; Commonly known as: CeleBREX   coenzyme Q10 100 mg tablet   digoxin 125 MCG tablet; Commonly known as: Lanoxin   docusate sodium 100 mg tablet; Commonly known as: Colace   Eliquis 2.5 mg tablet; Generic drug: apixaban   ergocalciferol 1.25 MG (17115 UT) capsule; Commonly known as: Vitamin   D-2   esomeprazole 40 mg DR capsule; Commonly known as: NexIUM; 1 po 1-2 times   daily, before breakfast and dinner,  as directed   Fosamax 70 mg tablet; Generic drug: alendronate   losartan 50 mg tablet; Commonly known as: Cozaar   magnesium oxide 400 mg tablet; Commonly known as: Mag-Ox   Niaspan Extended-Release 500 mg ER tablet; Generic drug: niacin   NON FORMULARY   PreserVision AREDS 2,148 mcg-113 mg-45 mg-17.4mg tablet; Generic drug:   vitamins A,C,E-zinc-copper   sildenafil 100 mg tablet; Commonly known as: Viagra   tadalafil 5 mg tablet; Commonly known as: Cialis; Take 1 tablet (5 mg)   by mouth once daily as needed for erectile dysfunction.     STOP taking these medications     oxyCODONE 5 mg immediate release tablet; Commonly known as: Roxicodone       Outpatient Follow-Up  Future Appointments   Date Time Provider Department Center   3/12/2025  9:20 AM [facility]

## 2024-07-04 NOTE — H&P
History Of Present Illness  Kumar Malave is a 78 y.o. male hypertension, coronary artery disease status post stenting, exercise-induced asthma, A-fib s/p ablation, history of PE on Eliquis, chronic bilateral lower extremity cellulitis presents to the emergency department due to chest pain and body aches.     ED Course  VS: T 37.3, HR 87, RR 16, BP 99/60, SpO2 92% on RA    Relevant Labs & Images:   CMP unremarkable  CBC: WBC 8.6, Hgb 12.5,   D-dimer 880  COVID, Flu A/B: not detected  VBG: pH 7.41, pCO2 39, bicarb 24, lactate 1.6    CT chest  New consolidative opacities in the posterior aspects of the bilateral lower lobes, with parenchymal volume loss suggesting atelectasis although superimposed pneumonia  including aspiration pneumonia is also possible in the appropriate clinical context.    Interventions: azithromycin 500mg & CTX 1g IV    In the ED, it was reports patient became hypoxic with ambulation.      Patient evaluated at bedside. Reports he started experience malaise, body aches, and chills for one day. Reports today he felt chest pain which sometimes from GERD but feels similar to heart attack and felt he should be evaluated. Denies cough, fever, URI symptoms, SOB, HA, changes in BM, sick contacts. Patient reports he felt better with oxygen but denies feeling short of breath. Satting currently 96% on RA.      Past Medical History  Past Medical History:   Diagnosis Date    Cellulitis of left lower limb 06/21/2017    Cellulitis of left leg without foot    Clotting disorder (Multi)     Prothrombin gene mutation    Contusion of left hip, initial encounter 06/21/2017    Hematoma of hip, left, initial encounter    Coronary artery disease     Elevated prostate specific antigen (PSA) 07/11/2017    PSA elevation    GERD (gastroesophageal reflux disease) 08/29/2023    Hypertension     Nontoxic single thyroid nodule 11/07/2017    Thyroid nodule    Olecranon bursitis, left elbow 08/01/2016    Olecranon bursitis  of left elbow    Other amnesia 04/01/2014    Memory change    Other disorders of pituitary gland (Multi) 07/11/2017    Empty sella syndrome    Other fatigue 11/07/2017    Fatigue    Other forms of dyspnea 10/27/2015    Exertional dyspnea    Other specified disorders of bone density and structure, unspecified site 04/01/2014    Osteopenia    Pain in leg, unspecified 11/06/2014    Leg pain    Paresthesia of skin 03/14/2017    Tingling sensation    Personal history of malignant neoplasm of other organs and systems     History of squamous cell carcinoma    Personal history of pulmonary embolism 08/03/2018    History of pulmonary embolism    Prothrombin gene mutation (Multi) 03/14/2017    Prothrombin [id number] mutation    Pulmonary embolism (Multi)     Radiculopathy, lumbosacral region 03/14/2017    Lumbosacral radiculopathy at L5    Syncope and collapse 08/16/2016    Syncope, unspecified syncope type    Unspecified atrial flutter (Multi) 03/14/2017    Atrial flutter with rapid ventricular response    Unspecified disorder of vestibular function, unspecified ear 04/01/2014    Vestibular dizziness    Venous insufficiency (chronic) (peripheral) 06/21/2017    Venous insufficiency (chronic) (peripheral)       Surgical History  Past Surgical History:   Procedure Laterality Date    MR HEAD ANGIO WO IV CONTRAST  4/9/2014    MR HEAD ANGIO WO IV CONTRAST 4/9/2014 [facility] CLINICAL LEGACY    MR NECK ANGIO WO IV CONTRAST  4/9/2014    MR NECK ANGIO WO IV CONTRAST 4/9/2014 [facility] CLINICAL LEGACY    OTHER SURGICAL HISTORY  04/01/2014    Previous Stent Placement        Social History  He reports that he has never smoked. He has never used smokeless tobacco. He reports current alcohol use of about 1.0 standard drink of alcohol per week. He reports that he does not use drugs.    Family History  Family History   Problem Relation Name Age of Onset    Coronary artery disease Father      Other (family health problem) Other          Allergies  Patient  "has no known allergies.    Current Outpatient Medications   Medication Instructions    alendronate (FOSAMAX) 70 mg, oral, Every 7 days, Take in the morning with a full glass of water, on an empty stomach, and do not take anything else by mouth or lie down for the next 30 min.    amLODIPine (Norvasc) 5 mg tablet 1 tablet, oral, Daily    armodafinil (NUVIGIL) 250 mg, oral, Daily PRN    aspirin-calcium carbonate 81 mg-300 mg calcium(777 mg) tablet 1 tablet, oral, Daily    aspirin 81 mg, oral, Daily RT    atorvastatin (Lipitor) 20 mg tablet 1 tablet, oral, Daily    azelastine (Astelin) 137 mcg (0.1 %) nasal spray 1 spray, Each Nostril, 2 times daily, Use in each nostril as directed    celecoxib (CeleBREX) 200 mg capsule oral    digoxin (Lanoxin) 125 MCG tablet 1 tablet, oral, Daily    docusate sodium (Colace) 100 mg tablet 2 tablets, oral, Daily    ergocalciferol (Vitamin D-2) 1.25 MG (05827 UT) capsule 1 capsule, oral, Once Weekly    esomeprazole (NexIUM) 40 mg DR capsule 1 po 1-2 times daily, before breakfast and dinner, as directed    ipratropium (Atrovent) 42 mcg (0.06 %) nasal spray nasal, USE 2 SPRAYS IN EACH NOSTRIL 2 TO 3 TIMES DAILY.<BR>    losartan (Cozaar) 50 mg tablet 1 tablet, oral, Daily    magnesium, amino acid chelate, 133 mg tablet 1 tablet, oral, 2 times daily    modafinil (ProvigiL) 200 mg tablet 1 tablet, oral, 2 times daily    niacin (Niaspan Extended-Release) 500 mg ER tablet 1 tablet, oral, Daily    oxyCODONE (ROXICODONE) 5 mg, oral, Every 6 hours PRN    sildenafil (Viagra) 100 mg tablet oral, TAKE 1 TABLET DAILY 1 HOUR BEFORE NEEDED<BR>    tadalafil (CIALIS) 5 mg, oral, Daily PRN    ubidecarenone (coenzyme Q10) 100 mg tablet 1 tablet, oral, Daily         Review of Systems  10pt reviewed negative unless listed in HPI    Objective     Last Recorded Vitals  Blood pressure 112/76, pulse 80, temperature 37.2 °C (99 °F), temperature source Oral, resp. rate 16, height 1.803 m (5' 11\"), weight 87.5 kg " (193 lb), SpO2 96%.     Physical Exam  Constitutional:       General: He is not in acute distress.     Appearance: He is not ill-appearing.   HENT:      Head: Normocephalic and atraumatic.      Mouth/Throat:      Mouth: Mucous membranes are moist.   Eyes:      Conjunctiva/sclera: Conjunctivae normal.   Cardiovascular:      Rate and Rhythm: Normal rate and regular rhythm.   Pulmonary:      Effort: Pulmonary effort is normal.      Breath sounds: No wheezing, rhonchi or rales.   Abdominal:      General: There is no distension.      Palpations: Abdomen is soft.      Tenderness: There is no abdominal tenderness.   Musculoskeletal:      Right lower leg: No edema.      Left lower leg: No edema.   Skin:     General: Skin is warm and dry.      Comments: Chronic erythematous rash on Rt LE calf   Neurological:      General: No focal deficit present.      Mental Status: He is alert and oriented to person, place, and time.   Psychiatric:         Mood and Affect: Mood normal.         Behavior: Behavior normal.           Relevant Results  Results for orders placed or performed during the hospital encounter of 07/03/24 (from the past 24 hour(s))   ECG 12 lead   Result Value Ref Range    Ventricular Rate 87 BPM    Atrial Rate 87 BPM    KY Interval 204 ms    QRS Duration 82 ms    QT Interval 330 ms    QTC Calculation(Bazett) 397 ms    P Axis 72 degrees    R Axis 6 degrees    T Axis 35 degrees    QRS Count 14 beats    Q Onset 223 ms    P Onset 121 ms    P Offset 171 ms    T Offset 388 ms    QTC Fredericia 373 ms   CBC with Differential   Result Value Ref Range    WBC 8.6 4.4 - 11.3 x10*3/uL    nRBC 0.0 0.0 - 0.0 /100 WBCs    RBC 4.50 4.50 - 5.90 x10*6/uL    Hemoglobin 12.5 (L) 13.5 - 17.5 g/dL    Hematocrit 37.3 (L) 41.0 - 52.0 %    MCV 83 80 - 100 fL    MCH 27.8 26.0 - 34.0 pg    MCHC 33.5 32.0 - 36.0 g/dL    RDW 15.2 (H) 11.5 - 14.5 %    Platelets 215 150 - 450 x10*3/uL    Neutrophils % 95.8 40.0 - 80.0 %    Immature Granulocytes %,  Automated 0.2 0.0 - 0.9 %    Lymphocytes % 3.3 13.0 - 44.0 %    Monocytes % 0.4 2.0 - 10.0 %    Eosinophils % 0.1 0.0 - 6.0 %    Basophils % 0.2 0.0 - 2.0 %    Neutrophils Absolute 8.21 (H) 1.60 - 5.50 x10*3/uL    Immature Granulocytes Absolute, Automated 0.02 0.00 - 0.50 x10*3/uL    Lymphocytes Absolute 0.28 (L) 0.80 - 3.00 x10*3/uL    Monocytes Absolute 0.03 (L) 0.05 - 0.80 x10*3/uL    Eosinophils Absolute 0.01 0.00 - 0.40 x10*3/uL    Basophils Absolute 0.02 0.00 - 0.10 x10*3/uL   Comprehensive Metabolic Panel   Result Value Ref Range    Glucose 110 (H) 74 - 99 mg/dL    Sodium 141 136 - 145 mmol/L    Potassium 4.1 3.5 - 5.3 mmol/L    Chloride 106 98 - 107 mmol/L    Bicarbonate 26 21 - 32 mmol/L    Anion Gap 13 10 - 20 mmol/L    Urea Nitrogen 26 (H) 6 - 23 mg/dL    Creatinine 1.18 0.50 - 1.30 mg/dL    eGFR 64 >60 mL/min/1.73m*2    Calcium 8.8 8.6 - 10.6 mg/dL    Albumin 4.0 3.4 - 5.0 g/dL    Alkaline Phosphatase 63 33 - 136 U/L    Total Protein 6.6 6.4 - 8.2 g/dL    AST 18 9 - 39 U/L    Bilirubin, Total 0.6 0.0 - 1.2 mg/dL    ALT 14 10 - 52 U/L   Brain Natriuretic Peptide   Result Value Ref Range    BNP 35 0 - 99 pg/mL   Troponin I, High Sensitivity, Initial   Result Value Ref Range    Troponin I, High Sensitivity (CMC) 4 0 - 53 ng/L   BLOOD GAS VENOUS FULL PANEL   Result Value Ref Range    POCT pH, Venous 7.41 7.33 - 7.43 pH    POCT pCO2, Venous 39 (L) 41 - 51 mm Hg    POCT pO2, Venous 23 (L) 35 - 45 mm Hg    POCT SO2, Venous 39 (L) 45 - 75 %    POCT Oxy Hemoglobin, Venous 38.4 (L) 45.0 - 75.0 %    POCT Hematocrit Calculated, Venous 38.0 (L) 41.0 - 52.0 %    POCT Sodium, Venous 138 136 - 145 mmol/L    POCT Potassium, Venous      POCT Chloride, Venous 105 98 - 107 mmol/L    POCT Ionized Calicum, Venous 1.16 1.10 - 1.33 mmol/L    POCT Glucose, Venous 97 74 - 99 mg/dL    POCT Lactate, Venous 1.6 0.4 - 2.0 mmol/L    POCT Base Excess, Venous 0.1 -2.0 - 3.0 mmol/L    POCT HCO3 Calculated, Venous 24.7 22.0 - 26.0 mmol/L     POCT Hemoglobin, Venous 12.8 (L) 13.5 - 17.5 g/dL    POCT Anion Gap, Venous      Patient Temperature 37.0 degrees Celsius    FiO2 21 %   Troponin, High Sensitivity, 1 Hour   Result Value Ref Range    Troponin I, High Sensitivity (CMC) 4 0 - 53 ng/L   Sars-CoV-2 PCR   Result Value Ref Range    Coronavirus 2019, PCR Not Detected Not Detected   Influenza A, and B PCR   Result Value Ref Range    Flu A Result Not Detected Not Detected    Flu B Result Not Detected Not Detected   D-dimer, VTE Exclusion   Result Value Ref Range    D-Dimer, Quantitative VTE Exclusion 880 (H) <=500 ng/mL FEU     CT angio chest for pulmonary embolism    Result Date: 7/4/2024  Interpreted By:  Benjy Rodney and Sheng Max STUDY: CT ANGIO CHEST FOR PULMONARY EMBOLISM;  7/4/2024 4:27 am   INDICATION: Signs/Symptoms:sob dimer.   Per EMR: 78-year-old male history of PE on AC presents to ED due to worsening chest pain.   COMPARISON: CT chest dated 07/01/2016, 10/30/2015   ACCESSION NUMBER(S): [id number]   ORDERING CLINICIAN: TAYE CALHOUN   TECHNIQUE: Contiguous axial images of the chest were obtained after the intravenous administration of 85 mL Omnipaque 350 contrast using angiographic PE protocol. Coronal and sagittal reformatted images were reconstructed from the axial data. MIP images were created on an independent workstation and reviewed.   FINDINGS: MEDIASTINUM AND LYMPH NODES: No enlarged intrathoracic or axillary lymph nodes by CT criteria. No pneumomediastinum.   VESSELS: Normal caliber aorta without dissection. The main pulmonary artery is of normal caliber. No pulmonary embolism. Mild atherosclerosis of the aorta and its major branches. Normal three vessel aortic arch.   HEART: Normal in size. Moderate-to-severe coronary artery calcifications predominantly in the left anterior descending artery. No significant pericardial effusion.   LUNG, AIRWAYS, AND PLEURA: New consolidative opacities in the posterior aspects of the bilateral  lower lobes, with parenchymal volume loss suggesting atelectasis although superimposed pneumonia including aspiration pneumonia is also possible in the appropriate clinical context. No pleural effusion or pneumothorax. The trachea and central airways are patent. No endobronchial lesion is seen.   OSSEOUS STRUCTURES: No acute osseous abnormality. There are no suspicious osseous lesions. Mild degenerative changes throughout the thoracic spine.   CHEST WALL SOFT TISSUES AND LOWER NECK: The chest wall is within normal limits. The visualized thyroid gland is within normal limits. Esophagus appears within normal limits as seen.   UPPER ABDOMEN/OTHER: Hypoattenuating incidental hepatic cyst. The remaining upper abdominal structures are unremarkable.       No acute pulmonary embolism. New consolidative opacities in the posterior aspects of the bilateral lower lobes, with parenchymal volume loss suggesting atelectasis although superimposed pneumonia including aspiration pneumonia is also possible in the appropriate clinical context.   Atherosclerosis, including coronary artery disease.   Additional incidental non-acute findings as detailed above.   I personally reviewed the images/study and I agree with the findings as stated by Dr. Raj Coronel. This study was interpreted at [place], [place].   MACRO: None.   Signed by: Benjy Rodney 7/4/2024 4:32 AM Dictation workstation:   [id number]    ECG 12 lead    Result Date: 7/4/2024  See ED provider note for full interpretation and clinical correlation Confirmed by Kumar Gamino ([id number]) on 7/4/2024 1:42:58 AM    XR chest 1 view    Result Date: 7/4/2024  STUDY: Chest Radiograph;  07/03/2024 INDICATION: Chest pain. COMPARISON: No priors available ACCESSION NUMBER(S): [id number] ORDERING CLINICIAN: TAYE CALHOUN TECHNIQUE:  Frontal chest was obtained at 22:24 hours. FINDINGS: CARDIOMEDIASTINAL SILHOUETTE: Cardiomediastinal silhouette is  normal in size and configuration.  LUNGS: Lungs are clear.  ABDOMEN: No remarkable upper abdominal findings.  BONES: No acute osseous changes.    No acute pulmonary abnormality. Signed by Bossman Bahena MD          Assessment/Plan   Principal Problem:    Organizing pneumonia (Multi)    Kumar Malave is a 78 y.o. male hypertension, coronary artery disease status post stenting, exercise-induced asthma, A-fib s/p ablation, history of PE on Eliquis, chronic bilateral lower extremity cellulitis presenting to ED for CP, body aches, and malaise admitted for organized pneumonia based on CT chest with new consolidative opacities in the posterior aspects of the bilateral lower lobes. Additionally, patient hypoxic with ambulation. Started on antimitotics. Determined to be hemodynamically stable and appropriate for regular nursing floor. To be admitted to Hospitalist Team D for further management. Detailed plan as follows:    #Organized PNA  -CAP or less likely aspiration PNA  -endorses chills & body aches  -hypoxic with ambulation in ED  -COVID, Flu A/B negative  -CXR Lungs are clear.   -CT chest with new consolidative opacities in the posterior aspects of the bilateral lower lobes although superimposed pneumonia including aspiration pneumonia is also possible in the appropriate clinical context.  -CURB 65 2pts moderate risk  -s/p azithromycin & CTX in ED  -c/w azithromycin 500mg  and CTX while inpatient and transition to p.o antibiotics for discharge  -supportive care & continued clinical monitoring  -Currently on RA  -6 minute walk test prior to discharge    H/o PE  A. Fib  -cont home Eliquis 2.5mg BID  -cont home aspirin      HTN  CAD s/p stenting  HLD  -cont home amlodipine  -cont home losartan   - cont home digoxin  -cont home niacin  -cont home atorvastatin    GERD  -use hospital formulary pantoprazole      Regular  Fluids: replenish PRN  O2: none  DVT ppx: home Eliquis  GI ppx: pantoprazole  Abx: ceftriaxone &  azithromycin    Code Status: FULL  NOK: Wife Renetta [phone]  PCP: Dr. Rupert Bond    Attending Supervision: discussed with attending physician, Dr. Sandoval.  Plan preliminary until cosigned by attending physician.           Fatemeh Bianchi, DO FM PGY2

## 2024-07-04 NOTE — PROGRESS NOTES
Pharmacy Medication History Review    Kumar Malave is a 78 y.o. male admitted for Organizing pneumonia (Multi). Pharmacy reviewed the patient's mbatw-rw-eopzlemrx medications and allergies for accuracy.    The list below reflects the updated PTA list. Comments regarding how patient may be taking medications differently can be found in the Admit Orders Activity  Prior to Admission Medications   Prescriptions Last Dose Informant   NON FORMULARY Past Week at Tuesday Self   Sig: Administer 1 each into affected nostril(s) once daily. 1 spray in each nostril daily. Nasal spray mix: Nasacort (whole bottle); Afrin (8 sprays); Astelin (whole bottle).   alendronate (Fosamax) 70 mg tablet Past Week at Monday Self   Sig: Take 1 tablet (70 mg) by mouth every 7 days. Take in the morning with a full glass of water, on an empty stomach, and do not take anything else by mouth or lie down for the next 30 min.   amLODIPine (Norvasc) 5 mg tablet Past Week at Tuesday Self   Sig: Take 1 tablet (5 mg) by mouth once daily.   apixaban (Eliquis) 2.5 mg tablet 7/4/2024 at 4 am Self   Sig: Take 1 tablet (2.5 mg) by mouth 2 times a day.   armodafinil (Nuvigil) 250 mg tablet 7/2/2024 at 2100 Self   Sig: Take 1 tablet (250 mg) by mouth once daily as needed (sleep apnea).   aspirin 81 mg EC tablet 7/3/2024 at Night Self   Sig: Take 1 tablet (81 mg) by mouth once daily at bedtime.   atorvastatin (Lipitor) 20 mg tablet 7/3/2024 at AM Self   Sig: Take 1 tablet (20 mg) by mouth once daily.   celecoxib (CeleBREX) 200 mg capsule More than a month Self   Sig: Take by mouth.   digoxin (Lanoxin) 125 MCG tablet 7/3/2024 Self   Sig: Take 1 tablet (125 mcg) by mouth once daily.   docusate sodium (Colace) 100 mg tablet 7/3/2024 Self   Sig: Take 2 tablets (200 mg) by mouth once daily.   ergocalciferol (Vitamin D-2) 1.25 MG (29629 UT) capsule Past Week at Monday Self   Sig: Take 1 capsule (1,250 mcg) by mouth 1 (one) time per week.   esomeprazole (NexIUM) 40 mg  DR capsule 7/3/2024 Self   Si po 1-2 times daily, before breakfast and dinner, as directed   losartan (Cozaar) 50 mg tablet 7/3/2024 at PM Self   Sig: Take 1 tablet (50 mg) by mouth once daily.   magnesium oxide (Mag-Ox) 400 mg tablet 7/3/2024 Self   Sig: Take 1 tablet (400 mg) by mouth 2 times a day.   niacin (Niaspan Extended-Release) 500 mg ER tablet 2024 at 2100 Self   Sig: Take 1 tablet (500 mg) by mouth once daily.   oxyCODONE (Roxicodone) 5 mg immediate release tablet Not Taking Self   Sig: Take 1 tablet (5 mg) by mouth every 6 hours if needed for severe pain (7 - 10) for up to 4 doses.   Patient not taking: Reported on 2024   sildenafil (Viagra) 100 mg tablet Past Week at  Self   Sig: Take 1 tablet (100 mg) by mouth once daily as needed. TAKE 1 TABLET DAILY 1 HOUR BEFORE NEEDED   tadalafil (Cialis) 5 mg tablet Past Week at  Self   Sig: Take 1 tablet (5 mg) by mouth once daily as needed for erectile dysfunction.   ubidecarenone (coenzyme Q10) 100 mg tablet 7/3/2024 at AM Self   Sig: Take 1 tablet by mouth once daily.   vitamins A,C,E-zinc-copper (PreserVision AREDS) 2,148 mcg-113 mg-45 mg-17.4mg tablet Past Week at Tuesday Self   Sig: Take 1 tablet by mouth 2 times a day.      Facility-Administered Medications: None        The list below reflects the updated allergy list. Please review each documented allergy for additional clarification and justification.  Allergies  Reviewed by Breanna Phoenix on 2024   No Known Allergies         Patient accepts M2B at discharge. Pharmacy has been updated to [facility].    Sources used to complete the med history include out patient fill history, OARRS, and patient interview (patient was a good historian.).    Below are additional concerns with the patient's PTA list.  ~No longer takes:    *Aspirin-Calcium Carbonate    *Atrovent    *Provigil    *Roxicodone    *Tamsulosin    ~Patients allergist () has patient make a nasal mix  spray since Astelin alone no longer works by itself.)    *Nasal mix incluse Nasacort, Afrin, and Astelin.     ~Areds eye vitamin and Eliquis was added to his list of medications.     BreAnna Phoenix, CPhT  Transitions of Care   Meds Ambulatory [facility] Retail Services  Please reach out via Secure Chat for questions, or if no response call [phone] or vocera [facility]

## 2024-07-05 LAB
LEGIONELLA AG UR QL: NEGATIVE
S PNEUM AG UR QL: NEGATIVE

## 2024-10-24 DIAGNOSIS — K21.9 GASTROESOPHAGEAL REFLUX DISEASE, UNSPECIFIED WHETHER ESOPHAGITIS PRESENT: ICD-10-CM

## 2024-10-24 RX ORDER — ESOMEPRAZOLE MAGNESIUM 40 MG/1
CAPSULE, DELAYED RELEASE ORAL
Qty: 60 CAPSULE | Refills: 0 | Status: SHIPPED | OUTPATIENT
Start: 2024-10-24

## 2024-12-05 ENCOUNTER — LAB (OUTPATIENT)
Dept: LAB | Facility: LAB | Age: 79
End: 2024-12-05
Payer: MEDICARE

## 2024-12-05 ENCOUNTER — OFFICE VISIT (OUTPATIENT)
Dept: CARDIOLOGY | Facility: HOSPITAL | Age: 79
End: 2024-12-05
Payer: MEDICARE

## 2024-12-05 VITALS
SYSTOLIC BLOOD PRESSURE: 131 MMHG | OXYGEN SATURATION: 98 % | DIASTOLIC BLOOD PRESSURE: 77 MMHG | HEIGHT: 70 IN | HEART RATE: 74 BPM | WEIGHT: 193 LBS | BODY MASS INDEX: 27.63 KG/M2

## 2024-12-05 DIAGNOSIS — Z86.711 HISTORY OF PULMONARY EMBOLUS (PE): ICD-10-CM

## 2024-12-05 DIAGNOSIS — E78.00 PURE HYPERCHOLESTEROLEMIA: ICD-10-CM

## 2024-12-05 DIAGNOSIS — Z95.5 S/P DRUG ELUTING CORONARY STENT PLACEMENT: ICD-10-CM

## 2024-12-05 DIAGNOSIS — Z86.2 H/O PROTHROMBIN MUTATION: ICD-10-CM

## 2024-12-05 DIAGNOSIS — I10 ESSENTIAL HYPERTENSION: ICD-10-CM

## 2024-12-05 DIAGNOSIS — R12 HEARTBURN: ICD-10-CM

## 2024-12-05 DIAGNOSIS — I25.10 ATHEROSCLEROSIS OF NATIVE CORONARY ARTERY OF NATIVE HEART WITHOUT ANGINA PECTORIS: Primary | ICD-10-CM

## 2024-12-05 DIAGNOSIS — I25.10 ATHEROSCLEROSIS OF NATIVE CORONARY ARTERY OF NATIVE HEART WITHOUT ANGINA PECTORIS: ICD-10-CM

## 2024-12-05 LAB — CARDIAC TROPONIN I PNL SERPL HS: 3 NG/L (ref 0–20)

## 2024-12-05 PROCEDURE — 3078F DIAST BP <80 MM HG: CPT | Performed by: INTERNAL MEDICINE

## 2024-12-05 PROCEDURE — 36415 COLL VENOUS BLD VENIPUNCTURE: CPT

## 2024-12-05 PROCEDURE — 84484 ASSAY OF TROPONIN QUANT: CPT

## 2024-12-05 PROCEDURE — 3075F SYST BP GE 130 - 139MM HG: CPT | Performed by: INTERNAL MEDICINE

## 2024-12-05 PROCEDURE — 99214 OFFICE O/P EST MOD 30 MIN: CPT | Performed by: INTERNAL MEDICINE

## 2024-12-05 PROCEDURE — 1159F MED LIST DOCD IN RCRD: CPT | Performed by: INTERNAL MEDICINE

## 2024-12-05 PROCEDURE — 93005 ELECTROCARDIOGRAM TRACING: CPT | Performed by: INTERNAL MEDICINE

## 2024-12-05 NOTE — PROGRESS NOTES
Referred by Dr. 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     Multiple cardiac risk factors including hypertension, hyperlipidemia, and a positive family history.    Prior cardiovascular history is notable for acute coronary syndrome/NSTEMI in March 2011. Cardiac cath at [facility] demonstrated 100% occlusion of the mid LAD, 50% D1, and subtotal occlusion of the distal left circumflex. Echocardiography demonstrated a mid anterior wall motion abnormality with overall preserved left ventricular function, EF 50%. He underwent successful bare-metal stenting with a 3 x 15 and 2.58 overlapping bare-metal Vision stents with a 0% residual stenosis.     December 2012: Admitted to the [facility] following a syncopal event. Noted to be in atrial fibrillation. Duration of AF was unknown. He ruled out for a myocardial infarction and the family had him transferred to [facility] for further evaluation. Underwent cardiac catheterizations on December 15, 2012 by me which demonstrated an 80% mid LAD stenosis, with only mild in-stent luminal narrowing within the proximal LAD, overlapping bare-metal stents. There was 100% occlusion of the distal left circumflex with bridging collaterals that fill a small OM2, and that was unchanged compared with his prior angiography. Pressure wire was performed to assess the severity of the mid-LAD stenosis. FFR post adenosine 180 mcg/kg/minute was 0.66 consistent with an ischemia-producing stenosis. He underwent successful drug-eluting stenting of the mid LAD with Resolute 2.75 x 14, post dilated to 3 high pressure of the mid LAD, resulting in a 0% residual  stenosis.    He subsequently underwent successful ablation for AFlutter/AF by Dr. Gillespie with no recurrent Aflutter or AF.     In January 2014, new SOB and diagnosed with PE. Anticoagulation since Jan 22, 2014. No recurrent PE. Triple anti-thrombotic therapy with ASA, Plavix and warfarin and then later in setting of AFlutter apixaban and ASA.     Presented Nov 2017 with atypical CP. Non-invasive nuclear stress performed. Results detailed below. Medically managed by increasing PPI due to recent RFA and exacerbation of GERD.      Called my office yesterday requesting urgent appointment for worsening of heartburn symptoms. Improved with doubling Nexium from 40 mg daily to 40 BID. No relationship to exercise. Denies chest pain, SOB, diaphoresis. No palpitations or rapid heart rate. Denies claudication, TIAs, syncope/pre-syncope.    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 Pulmonary at [facility] also noted reactive airway disease and prescribed antibiotics.      Past Medical History:  He has a past medical history of Cellulitis of left lower limb (06/21/2017), Clotting disorder (Multi), Contusion of left hip, initial encounter (06/21/2017), Coronary artery disease, Elevated prostate specific antigen (PSA) (07/11/2017), GERD (gastroesophageal reflux disease) (08/29/2023), Hypertension, Nontoxic single thyroid nodule (11/07/2017), Olecranon bursitis, left elbow (08/01/2016), Other amnesia (04/01/2014), Other disorders of pituitary gland (Multi) (07/11/2017), Other fatigue (11/07/2017), Other forms of dyspnea (10/27/2015), Other specified disorders of bone density and structure, unspecified site (04/01/2014), Pain in leg, unspecified (11/06/2014), Paresthesia of skin (03/14/2017), Personal history of malignant neoplasm of other organs and systems, Personal history of pulmonary embolism (08/03/2018), Prothrombin  gene mutation (Multi) (03/14/2017), Pulmonary embolism (Multi), Radiculopathy, lumbosacral region (03/14/2017), Syncope and collapse (08/16/2016), Unspecified atrial flutter (Multi) (03/14/2017), Unspecified disorder of vestibular function, unspecified ear (04/01/2014), and Venous insufficiency (chronic) (peripheral) (06/21/2017).    Past Surgical History:  He has a past surgical history that includes Other surgical history (04/01/2014); MR angio head wo IV contrast (4/9/2014); and MR angio neck wo IV contrast (4/9/2014).      Social History:  He reports that he has never smoked. He has never used smokeless tobacco. He reports current alcohol use of about 1.0 standard drink of alcohol per week. He reports that he does not use drugs.    Family History:  Family History   Problem Relation Name Age of Onset    Coronary artery disease Father      Other (family health problem) Other          Allergies:  Patient has no known allergies.    Outpatient Medications:  Current Outpatient Medications   Medication Instructions    alendronate (FOSAMAX) 70 mg, Every 7 days    amLODIPine (Norvasc) 5 mg tablet 1 tablet, Daily    apixaban (ELIQUIS) 2.5 mg, 2 times daily    armodafinil (NUVIGIL) 250 mg, Daily PRN    aspirin 81 mg, Nightly    atorvastatin (Lipitor) 20 mg tablet 1 tablet, Daily    azithromycin (ZITHROMAX) 500 mg, oral, Daily    celecoxib (CeleBREX) 200 mg capsule Take by mouth.    digoxin (Lanoxin) 125 MCG tablet 1 tablet, Daily    docusate sodium (Colace) 100 mg tablet 2 tablets, Daily    ergocalciferol (Vitamin D-2) 1.25 MG (66224 UT) capsule 1 capsule, Once Weekly    esomeprazole (NexIUM) 40 mg DR capsule TAKE 1 CAPSULE BY MOUTH 1-2 TIMES DAILY, BEFORE BREAKFAST AND DINNER, AS DIRECTED.*LAST REFILL TIL SEEN IN OFFICE*    losartan (Cozaar) 50 mg tablet 1 tablet, Daily    magnesium oxide (MAG-OX) 400 mg, 2 times daily    niacin (Niaspan Extended-Release) 500 mg ER tablet 1 tablet, Daily    NON FORMULARY 1 each, Daily     "sildenafil (VIAGRA) 100 mg, Daily PRN    tadalafil (CIALIS) 5 mg, oral, Daily PRN    ubidecarenone (coenzyme Q10) 100 mg tablet 1 tablet, Daily    vitamins A,C,E-zinc-copper (PreserVision AREDS) 2,148 mcg-113 mg-45 mg-17.4mg tablet 1 tablet, 2 times daily (0900,1400)        Last Recorded Vitals:  Vitals:    12/05/24 0827 12/05/24 0829   BP: 131/77 131/77   BP Location: Right arm Left arm   Patient Position: Sitting Sitting   BP Cuff Size:  Adult   Pulse: 74 74   SpO2: 98% 98%   Weight: 87.5 kg (193 lb) 87.5 kg (193 lb)   Height: 1.778 m (5' 10\") 1.778 m (5' 10\")       Physical Exam:  Skin: No rash.     HEENT: Non-palpable thyroid.     Lungs: Clear. No wheezes. Do not appreciate dullness or reduced breath sounds indicative of paralysis if diaphragm.    Cardiac: There is no jugular venous distension. PMI non-displaced. S1, S2. No S3. No S4. No murmurs.     Abdomen: No hepatosplenomegaly. No abdominal bruit. Normal aortic impulse.    Extremities: No edema.    Pulses: Carotid 2+ bilateral. No carotid bruits. Radial 2+ bilateral. Popliteal 2+ bilateral. Posterior tibial 2+ bilateral.    Neuro: Non-focal. Wide-based gait.         Last Labs:  CBC -  Lab Results   Component Value Date    WBC 8.6 07/03/2024    HGB 12.5 (L) 07/03/2024    HCT 37.3 (L) 07/03/2024    MCV 83 07/03/2024     07/03/2024       CMP -  Lab Results   Component Value Date    CALCIUM 8.8 07/03/2024    PROT 6.6 07/03/2024    ALBUMIN 4.0 07/03/2024    AST 18 07/03/2024    ALT 14 07/03/2024    ALKPHOS 63 07/03/2024    BILITOT 0.6 07/03/2024       LIPID PANEL -   No results found for: \"CHOL\", \"HDL\", \"CHHDL\", \"LDL\", \"VLDL\", \"TRIG\", \"NHDL\"    RENAL FUNCTION PANEL -   Lab Results   Component Value Date    K 4.1 07/03/2024       Lab Results   Component Value Date    BNP 35 07/03/2024    HGBA1C 6.0 (H) 09/24/2024       Last Cardiology Tests:  ECG:  Review of the ECG by me shows normal sinus rhythm with 1st degree AVB ( msec). Low voltage QRS. There " are no ECG criteria for left atrial enlargement, left ventricular hypertrophy.  PRWP c/w CWR, cannot rule out anterior MI.      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 GERD improved with doubling PPI. No chest pain/angina. Heartburn with no relationship to activity. Excellent OMT with ASA 81 mg, atorvastatin 20 with Niaspan, Losartan 50/amlodipine 5 for HTN, and Eliquis 2.5 mg BID for DVT/PE secondary prevention in setting of pro-thrombin mutation. Digoxin 0.125 for rate control given ED and request for no beta-blockers. Will obtain stat high-sensitivity troponin to rule out cardiac etiology of heartburn symptoms. If elevated hs-cTNI then direct admission for cardiac cath. Instructed to call for chest pain/angina.    Dusty Welch MD

## 2024-12-06 LAB
ATRIAL RATE: 74 BPM
P AXIS: 80 DEGREES
P OFFSET: 160 MS
P ONSET: 114 MS
PR INTERVAL: 216 MS
Q ONSET: 222 MS
QRS COUNT: 12 BEATS
QRS DURATION: 88 MS
QT INTERVAL: 374 MS
QTC CALCULATION(BAZETT): 415 MS
QTC FREDERICIA: 401 MS
R AXIS: 4 DEGREES
T AXIS: 48 DEGREES
T OFFSET: 409 MS
VENTRICULAR RATE: 74 BPM

## 2025-01-14 DIAGNOSIS — K21.9 GASTROESOPHAGEAL REFLUX DISEASE, UNSPECIFIED WHETHER ESOPHAGITIS PRESENT: ICD-10-CM

## 2025-01-14 RX ORDER — ESOMEPRAZOLE MAGNESIUM 40 MG/1
CAPSULE, DELAYED RELEASE ORAL
Qty: 60 CAPSULE | Refills: 0 | Status: SHIPPED | OUTPATIENT
Start: 2025-01-14

## 2025-03-10 NOTE — PROGRESS NOTES
HPI    79 y.o. male being seen with the following problem list:    Problem list:  BPH with bothersome LUTS. S/p HoLEP 10/24/23. 104g benign     Recent MRI 1/2023 at [facility] - 220g gland, no concerning lesions.     2 prior bx, neg. About 3 years ago had an episode of retention, resolved, but then had a brief period of SIC. Now with freq, urg, slow stream, dysuria. No hematuria, no documented UTIs. On elliquis. On flomax, daily tadalafil for LUTS, on sildenafil prn for erections, works reasonably well with meds. Would like something done surgically after golf season in late september.     6/7/23- Patient has weak stream at times, urgency and frequency. He is on Eliquis and Aspirin medications. Cannot stop aspirin per his report. PVR today 35cc      10/26/23 - seen today for tov. 300cc in, 300cc out. No issues since surgery     11/9/23 - seen today for PVR. Stream is like a firehose. No leakage. Frequency and a bit of dysuria, slowly getting better.     3/13/24 - seen today for 3mo PVR. PVR 6ml.  Thrilled with his result. Profoundly strong stream. No bothersome freq or urgency, no leakage. On combination therapy for ED, needs for cialis 5mg prn. Working well.    03/12/25 - PVR 4cc. Stream remains profoundly strong, leakage resolved. Continues to be happy wit his outcome. Erections works well on combination therapy; Cialis 5mg+ Viagra 100mg prn        Current Medications:  Current Outpatient Medications   Medication Sig Dispense Refill    alendronate (Fosamax) 70 mg tablet Take 1 tablet (70 mg) by mouth every 7 days. Take in the morning with a full glass of water, on an empty stomach, and do not take anything else by mouth or lie down for the next 30 min.      amLODIPine (Norvasc) 5 mg tablet Take 1 tablet (5 mg) by mouth once daily.      apixaban (Eliquis) 2.5 mg tablet Take 1 tablet (2.5 mg) by mouth 2 times a day.      armodafinil (Nuvigil) 250 mg tablet Take 1 tablet (250 mg) by mouth once daily as needed (sleep apnea).       aspirin 81 mg EC tablet Take 1 tablet (81 mg) by mouth once daily at bedtime.      atorvastatin (Lipitor) 20 mg tablet Take 1 tablet (20 mg) by mouth once daily.      azithromycin (Zithromax) 500 mg tablet Take 1 tablet (500 mg) by mouth once daily. (Patient not taking: Reported on 12/5/2024) 5 tablet 0    celecoxib (CeleBREX) 200 mg capsule Take by mouth. (Patient not taking: Reported on 12/5/2024)      digoxin (Lanoxin) 125 MCG tablet Take 1 tablet (125 mcg) by mouth once daily.      docusate sodium (Colace) 100 mg tablet Take 2 tablets (200 mg) by mouth once daily.      ergocalciferol (Vitamin D-2) 1.25 MG (67774 UT) capsule Take 1 capsule (1,250 mcg) by mouth 1 (one) time per week.      esomeprazole (NexIUM) 40 mg DR capsule TAKE 1 CAPSULE BY MOUTH 1-2 TIMES DAILY, BEFORE BREAKFAST AND DINNER, AS DIRECTED.*LAST REFILL TIL SEEN IN OFFICE* 60 capsule 0    losartan (Cozaar) 50 mg tablet Take 1 tablet (50 mg) by mouth once daily.      magnesium oxide (Mag-Ox) 400 mg tablet Take 1 tablet (400 mg) by mouth 2 times a day.      niacin (Niaspan Extended-Release) 500 mg ER tablet Take 1 tablet (500 mg) by mouth once daily.      NON FORMULARY Administer 1 each into affected nostril(s) once daily. 1 spray in each nostril daily. Nasal spray mix: Nasacort (whole bottle); Afrin (8 sprays); Astelin (whole bottle).      sildenafil (Viagra) 100 mg tablet Take 1 tablet (100 mg) by mouth once daily as needed. TAKE 1 TABLET DAILY 1 HOUR BEFORE NEEDED      tadalafil (Cialis) 5 mg tablet Take 1 tablet (5 mg) by mouth once daily as needed for erectile dysfunction. 30 tablet 3    ubidecarenone (coenzyme Q10) 100 mg tablet Take 1 tablet by mouth once daily.      vitamins A,C,E-zinc-copper (PreserVision AREDS) 2,148 mcg-113 mg-45 mg-17.4mg tablet Take 1 tablet by mouth 2 times a day.       No current facility-administered medications for this visit.        Active Problems:  Kumar Malave is a 79 y.o. male with the following Problems  and Medications.  Patient Active Problem List   Diagnosis    Allergic rhinitis    Arteriosclerotic heart disease    Atrial fibrillation (Multi)    Empty sella syndrome (Multi)    Eructation    Exertional dyspnea    GERD (gastroesophageal reflux disease)    Hyperlipidemia    Hypertension    Lumbosacral radiculopathy at L5    Memory change    Olecranon bursitis of left elbow    GHANSHYAM on CPAP    Osteopenia    Prothrombin [id number] mutation (Multi)    PSA elevation    Thyroid nodule    Venous insufficiency (chronic) (peripheral)    Pulmonary embolus    MI (myocardial infarction) (Multi)    Anemia    Erectile dysfunction    Organizing pneumonia (Multi)    Pneumonia of both lower lobes due to infectious organism    Chronic anticoagulation    History of atrial fibrillation     Current Outpatient Medications   Medication Sig Dispense Refill    alendronate (Fosamax) 70 mg tablet Take 1 tablet (70 mg) by mouth every 7 days. Take in the morning with a full glass of water, on an empty stomach, and do not take anything else by mouth or lie down for the next 30 min.      amLODIPine (Norvasc) 5 mg tablet Take 1 tablet (5 mg) by mouth once daily.      apixaban (Eliquis) 2.5 mg tablet Take 1 tablet (2.5 mg) by mouth 2 times a day.      armodafinil (Nuvigil) 250 mg tablet Take 1 tablet (250 mg) by mouth once daily as needed (sleep apnea).      aspirin 81 mg EC tablet Take 1 tablet (81 mg) by mouth once daily at bedtime.      atorvastatin (Lipitor) 20 mg tablet Take 1 tablet (20 mg) by mouth once daily.      azithromycin (Zithromax) 500 mg tablet Take 1 tablet (500 mg) by mouth once daily. (Patient not taking: Reported on 12/5/2024) 5 tablet 0    celecoxib (CeleBREX) 200 mg capsule Take by mouth. (Patient not taking: Reported on 12/5/2024)      digoxin (Lanoxin) 125 MCG tablet Take 1 tablet (125 mcg) by mouth once daily.      docusate sodium (Colace) 100 mg tablet Take 2 tablets (200 mg) by mouth once daily.      ergocalciferol (Vitamin  D-2) 1.25 MG (25947 UT) capsule Take 1 capsule (1,250 mcg) by mouth 1 (one) time per week.      esomeprazole (NexIUM) 40 mg DR capsule TAKE 1 CAPSULE BY MOUTH 1-2 TIMES DAILY, BEFORE BREAKFAST AND DINNER, AS DIRECTED.*LAST REFILL TIL SEEN IN OFFICE* 60 capsule 0    losartan (Cozaar) 50 mg tablet Take 1 tablet (50 mg) by mouth once daily.      magnesium oxide (Mag-Ox) 400 mg tablet Take 1 tablet (400 mg) by mouth 2 times a day.      niacin (Niaspan Extended-Release) 500 mg ER tablet Take 1 tablet (500 mg) by mouth once daily.      NON FORMULARY Administer 1 each into affected nostril(s) once daily. 1 spray in each nostril daily. Nasal spray mix: Nasacort (whole bottle); Afrin (8 sprays); Astelin (whole bottle).      sildenafil (Viagra) 100 mg tablet Take 1 tablet (100 mg) by mouth once daily as needed. TAKE 1 TABLET DAILY 1 HOUR BEFORE NEEDED      tadalafil (Cialis) 5 mg tablet Take 1 tablet (5 mg) by mouth once daily as needed for erectile dysfunction. 30 tablet 3    ubidecarenone (coenzyme Q10) 100 mg tablet Take 1 tablet by mouth once daily.      vitamins A,C,E-zinc-copper (PreserVision AREDS) 2,148 mcg-113 mg-45 mg-17.4mg tablet Take 1 tablet by mouth 2 times a day.       No current facility-administered medications for this visit.       PMH:  Past Medical History:   Diagnosis Date    Cellulitis of left lower limb 06/21/2017    Cellulitis of left leg without foot    Clotting disorder (Multi)     Prothrombin gene mutation    Contusion of left hip, initial encounter 06/21/2017    Hematoma of hip, left, initial encounter    Coronary artery disease     Elevated prostate specific antigen (PSA) 07/11/2017    PSA elevation    GERD (gastroesophageal reflux disease) 08/29/2023    Hypertension     Nontoxic single thyroid nodule 11/07/2017    Thyroid nodule    Olecranon bursitis, left elbow 08/01/2016    Olecranon bursitis of left elbow    Other amnesia 04/01/2014    Memory change    Other disorders of pituitary gland  07/11/2017    Empty sella syndrome    Other fatigue 11/07/2017    Fatigue    Other forms of dyspnea 10/27/2015    Exertional dyspnea    Other specified disorders of bone density and structure, unspecified site 04/01/2014    Osteopenia    Pain in leg, unspecified 11/06/2014    Leg pain    Paresthesia of skin 03/14/2017    Tingling sensation    Personal history of malignant neoplasm of other organs and systems     History of squamous cell carcinoma    Personal history of pulmonary embolism 08/03/2018    History of pulmonary embolism    Prothrombin gene mutation (Multi) 03/14/2017    Prothrombin [id number] mutation    Pulmonary embolism     Radiculopathy, lumbosacral region 03/14/2017    Lumbosacral radiculopathy at L5    Syncope and collapse 08/16/2016    Syncope, unspecified syncope type    Unspecified atrial flutter (Multi) 03/14/2017    Atrial flutter with rapid ventricular response    Unspecified disorder of vestibular function, unspecified ear 04/01/2014    Vestibular dizziness    Venous insufficiency (chronic) (peripheral) 06/21/2017    Venous insufficiency (chronic) (peripheral)       PSH:  Past Surgical History:   Procedure Laterality Date    MR HEAD ANGIO WO IV CONTRAST  4/9/2014    MR HEAD ANGIO WO IV CONTRAST 4/9/2014 [facility]    MR NECK ANGIO WO IV CONTRAST  4/9/2014    MR NECK ANGIO WO IV CONTRAST 4/9/2014 [facility]    OTHER SURGICAL HISTORY  04/01/2014    Previous Stent Placement       FMH:  Family History   Problem Relation Name Age of Onset    Coronary artery disease Father      Other (family health problem) Other         SHx:  Social History     Tobacco Use    Smoking status: Never    Smokeless tobacco: Never   Vaping Use    Vaping status: Never Used   Substance Use Topics    Alcohol use: Yes     Alcohol/week: 1.0 standard drink of alcohol     Types: 1 Cans of beer per week    Drug use: Never       Allergies:  No Known Allergies      Assessment/Plan  Doing well overall, emptying  great, stream remains strong. No issues or concerns. Continues to be happy with his outcome.     Erections working well on combination therapy.     FU in 1 year with PVR.     Mauro Attestation  By signing my name below, I, Mauro Flores, attest that this documentation has been prepared under the direction and in the presence of Marcelino Crook MD.

## 2025-03-12 ENCOUNTER — OFFICE VISIT (OUTPATIENT)
Dept: UROLOGY | Facility: HOSPITAL | Age: 80
End: 2025-03-12
Payer: MEDICARE

## 2025-03-12 DIAGNOSIS — N52.9 ERECTILE DYSFUNCTION, UNSPECIFIED ERECTILE DYSFUNCTION TYPE: ICD-10-CM

## 2025-03-12 DIAGNOSIS — N40.0 BENIGN PROSTATIC HYPERPLASIA WITHOUT LOWER URINARY TRACT SYMPTOMS: Primary | ICD-10-CM

## 2025-03-12 PROCEDURE — G2211 COMPLEX E/M VISIT ADD ON: HCPCS | Performed by: UROLOGY

## 2025-03-12 PROCEDURE — 99213 OFFICE O/P EST LOW 20 MIN: CPT | Performed by: UROLOGY

## 2025-03-12 PROCEDURE — 51798 US URINE CAPACITY MEASURE: CPT | Performed by: UROLOGY

## 2025-03-12 PROCEDURE — 1159F MED LIST DOCD IN RCRD: CPT | Performed by: UROLOGY

## 2025-03-12 PROCEDURE — 1036F TOBACCO NON-USER: CPT | Performed by: UROLOGY

## 2025-05-22 DIAGNOSIS — K21.9 GASTROESOPHAGEAL REFLUX DISEASE, UNSPECIFIED WHETHER ESOPHAGITIS PRESENT: ICD-10-CM

## 2025-05-22 RX ORDER — ESOMEPRAZOLE MAGNESIUM 40 MG/1
CAPSULE, DELAYED RELEASE ORAL
Qty: 180 CAPSULE | Refills: 0 | Status: SHIPPED | OUTPATIENT
Start: 2025-05-22

## 2025-07-31 ENCOUNTER — APPOINTMENT (OUTPATIENT)
Dept: GASTROENTEROLOGY | Facility: CLINIC | Age: 80
End: 2025-07-31
Payer: MEDICARE

## 2025-07-31 VITALS — BODY MASS INDEX: 26.32 KG/M2 | HEIGHT: 71 IN | HEART RATE: 74 BPM | WEIGHT: 188 LBS

## 2025-07-31 DIAGNOSIS — K59.00 CONSTIPATION, UNSPECIFIED CONSTIPATION TYPE: ICD-10-CM

## 2025-07-31 DIAGNOSIS — K21.9 GASTROESOPHAGEAL REFLUX DISEASE, UNSPECIFIED WHETHER ESOPHAGITIS PRESENT: Primary | ICD-10-CM

## 2025-07-31 PROBLEM — J84.89 ORGANIZING PNEUMONIA (MULTI): Status: RESOLVED | Noted: 2024-07-04 | Resolved: 2025-07-31

## 2025-07-31 PROCEDURE — 1159F MED LIST DOCD IN RCRD: CPT | Performed by: INTERNAL MEDICINE

## 2025-07-31 PROCEDURE — 99213 OFFICE O/P EST LOW 20 MIN: CPT | Performed by: INTERNAL MEDICINE

## 2025-07-31 PROCEDURE — 1160F RVW MEDS BY RX/DR IN RCRD: CPT | Performed by: INTERNAL MEDICINE

## 2025-07-31 PROCEDURE — 1036F TOBACCO NON-USER: CPT | Performed by: INTERNAL MEDICINE

## 2025-07-31 RX ORDER — ESOMEPRAZOLE MAGNESIUM 40 MG/1
CAPSULE, DELAYED RELEASE ORAL
Qty: 180 CAPSULE | Refills: 3 | Status: SHIPPED | OUTPATIENT
Start: 2025-07-31

## 2025-07-31 NOTE — PROGRESS NOTES
Subjective     History of Present Illness:   Kumar Malave is a 79 y.o. male who presents to GI clinic for has been changing back and forth from Align to VSL#3.  Also uses magnesium usually 625 mg daily.  Generally doing well. Not having the bloating, belching.  BM formed. Normal consistency. Generally 6-8 BM/day which feels comfortable for him..    GERD not an issue if occasionally takes the Nexium bid and no dysphagia for years with slight head of bed elevation.    Review of Systems  Review of Systems    Social History   reports that he has never smoked. He has never used smokeless tobacco. He reports current alcohol use of about 1.0 standard drink of alcohol per week. He reports that he does not use drugs.     Allergies  RX Allergies[1]    Medications  Current Outpatient Medications   Medication Instructions    alendronate (FOSAMAX) 70 mg, Every 7 days    amLODIPine (Norvasc) 5 mg tablet 1 tablet, Daily    apixaban (ELIQUIS) 2.5 mg, 2 times daily    armodafinil (NUVIGIL) 250 mg, Daily PRN    aspirin 81 mg, Nightly    atorvastatin (Lipitor) 20 mg tablet 1 tablet, Daily    azithromycin (ZITHROMAX) 500 mg, oral, Daily    celecoxib (CeleBREX) 200 mg capsule Take by mouth.    digoxin (Lanoxin) 125 MCG tablet 1 tablet, Daily    docusate sodium (Colace) 100 mg tablet 2 tablets, Daily    ergocalciferol (Vitamin D-2) 1.25 MG (61049 UT) capsule 1 capsule, Once Weekly    esomeprazole (NexIUM) 40 mg DR capsule TAKE 1 CAPSULE BY MOUTH 1-2 TIMES DAILY, BEFORE BREAKFAST AND DINNER, AS DIRECTED.*LAST REFILL TIL SEEN IN OFFICE*    losartan (Cozaar) 50 mg tablet 1 tablet, Daily    magnesium oxide (MAG-OX) 400 mg, 2 times daily    niacin (Niaspan Extended-Release) 500 mg ER tablet 1 tablet, Daily    NON FORMULARY 1 each, Daily    sildenafil (VIAGRA) 100 mg, Daily PRN    tadalafil (CIALIS) 5 mg, oral, Daily PRN    ubidecarenone (coenzyme Q10) 100 mg tablet 1 tablet, Daily    vitamins A,C,E-zinc-copper (PreserVision AREDS) 2,148  mcg-113 mg-45 mg-17.4mg tablet 1 tablet, 2 times daily (0900,1400)        Objective   Visit Vitals  Pulse 74      Physical Exam  Constitutional:       Appearance: Normal appearance. He is normal weight.   HENT:      Head: Normocephalic and atraumatic.      Mouth/Throat:      Mouth: Mucous membranes are moist.      Pharynx: Oropharynx is clear.     Eyes:      Pupils: Pupils are equal, round, and reactive to light.       Neurological:      Mental Status: He is alert.     Psychiatric:         Mood and Affect: Mood normal.         Behavior: Behavior normal.         Thought Content: Thought content normal.         Judgment: Judgment normal.                       Assessment/Plan   Kumar Malave is a 79 y.o. male who presents to GI clinic for   GERD well controlled on the Nexium 1-2 times/day and slight head of bed elevation.    Constipation well controlled with magnesium/probiotic    Continue same    Over 50% of the visit was spent in counseling and discussion. .      Jeramy Zhao MD              [1] No Known Allergies

## (undated) DEVICE — PIRANHA BLADES STORZ

## (undated) DEVICE — BAG, DRAINAGE, ANTI-REFLUX CHAMBER, 2000ML

## (undated) DEVICE — CATHETER, SECURE DEVICE, URINARY, ADH

## (undated) DEVICE — Device

## (undated) DEVICE — LUBRICANT, WATER SOLUBLE, BACTERIOSTATIC, 2 OZ, STERILE

## (undated) DEVICE — PLUG, CATHETER

## (undated) DEVICE — SLEEVE, VASO PRESS, CALF GARMENT, MEDIUM, GREEN

## (undated) DEVICE — DRESSING, GAUZE, PETROLATUM, VASELINE, 3 X 36 IN, STERILE

## (undated) DEVICE — SYRINGE, 60 CC, IRRIGATION, PISTON, CATH TIP, W/LUER ADAPTER,DISP

## (undated) DEVICE — TUBING, CLEAR N-COND, 5MM X 10, LF

## (undated) DEVICE — PIRANHA TUBING SET

## (undated) DEVICE — SYRINGE, 50 CC, LUER LOCK

## (undated) DEVICE — COLLECTION BAG, FLUID, NON-STERILE

## (undated) DEVICE — CATHETER, LASER URETERAL, 7.1FR, 40CM

## (undated) DEVICE — PIRANHA TISSUE CONTAINER

## (undated) DEVICE — CATHETER, IRRIGATION, CURITY, 22FR